# Patient Record
Sex: MALE | Race: BLACK OR AFRICAN AMERICAN | NOT HISPANIC OR LATINO | Employment: PART TIME | URBAN - METROPOLITAN AREA
[De-identification: names, ages, dates, MRNs, and addresses within clinical notes are randomized per-mention and may not be internally consistent; named-entity substitution may affect disease eponyms.]

---

## 2017-10-23 RX ORDER — LISINOPRIL AND HYDROCHLOROTHIAZIDE 20; 25 MG/1; MG/1
TABLET ORAL
Qty: 90 TABLET | Refills: 0 | Status: SHIPPED | OUTPATIENT
Start: 2017-10-23 | End: 2018-01-26 | Stop reason: SDUPTHER

## 2017-10-23 RX ORDER — PRAVASTATIN SODIUM 20 MG/1
TABLET ORAL
Qty: 90 TABLET | Refills: 0 | Status: SHIPPED | OUTPATIENT
Start: 2017-10-23 | End: 2018-01-26 | Stop reason: SDUPTHER

## 2017-10-23 NOTE — TELEPHONE ENCOUNTER
Patient needs lab every 6 months multiple statins CBC CMP lipid if his diabetes.  Hemoglobin A1c if hypothyroidism add T4 TSH see me 2 weeks after lab done to review lab, get refills, get  Into [facility]

## 2018-01-23 RX ORDER — LISINOPRIL AND HYDROCHLOROTHIAZIDE 20; 25 MG/1; MG/1
TABLET ORAL
Qty: 90 TABLET | Refills: 0 | Status: CANCELLED | OUTPATIENT
Start: 2018-01-23

## 2018-01-23 RX ORDER — PRAVASTATIN SODIUM 20 MG/1
TABLET ORAL
Qty: 90 TABLET | Refills: 0 | Status: CANCELLED | OUTPATIENT
Start: 2018-01-23

## 2018-01-26 ENCOUNTER — OFFICE VISIT (OUTPATIENT)
Dept: PRIMARY CARE CLINIC | Facility: CLINIC | Age: 58
End: 2018-01-26
Payer: COMMERCIAL

## 2018-01-26 VITALS
WEIGHT: 282 LBS | TEMPERATURE: 98 F | DIASTOLIC BLOOD PRESSURE: 78 MMHG | SYSTOLIC BLOOD PRESSURE: 121 MMHG | BODY MASS INDEX: 38.19 KG/M2 | OXYGEN SATURATION: 97 % | HEART RATE: 60 BPM | RESPIRATION RATE: 18 BRPM | HEIGHT: 72 IN

## 2018-01-26 DIAGNOSIS — H61.20 IMPACTED CERUMEN, UNSPECIFIED LATERALITY: ICD-10-CM

## 2018-01-26 DIAGNOSIS — E66.3 OVERWEIGHT: ICD-10-CM

## 2018-01-26 DIAGNOSIS — E78.5 HYPERLIPIDEMIA, UNSPECIFIED HYPERLIPIDEMIA TYPE: ICD-10-CM

## 2018-01-26 DIAGNOSIS — I10 HYPERTENSION, UNSPECIFIED TYPE: ICD-10-CM

## 2018-01-26 DIAGNOSIS — H18.419 ARCUS SENILIS, UNSPECIFIED LATERALITY: ICD-10-CM

## 2018-01-26 DIAGNOSIS — J40 BRONCHITIS: ICD-10-CM

## 2018-01-26 DIAGNOSIS — R19.7 DIARRHEA, UNSPECIFIED TYPE: ICD-10-CM

## 2018-01-26 DIAGNOSIS — L25.9 CONTACT DERMATITIS, UNSPECIFIED CONTACT DERMATITIS TYPE, UNSPECIFIED TRIGGER: ICD-10-CM

## 2018-01-26 DIAGNOSIS — J32.9 SINUSITIS, UNSPECIFIED CHRONICITY, UNSPECIFIED LOCATION: Primary | ICD-10-CM

## 2018-01-26 PROCEDURE — 99999 PR PBB SHADOW E&M-EST. PATIENT-LVL IV: CPT | Mod: PBBFAC,,, | Performed by: FAMILY MEDICINE

## 2018-01-26 PROCEDURE — 99213 OFFICE O/P EST LOW 20 MIN: CPT | Mod: 25,S$GLB,, | Performed by: FAMILY MEDICINE

## 2018-01-26 PROCEDURE — 96372 THER/PROPH/DIAG INJ SC/IM: CPT | Mod: S$GLB,,, | Performed by: FAMILY MEDICINE

## 2018-01-26 RX ORDER — ALBUTEROL SULFATE 90 UG/1
2 AEROSOL, METERED RESPIRATORY (INHALATION) EVERY 4 HOURS PRN
Qty: 1 INHALER | Refills: 5 | Status: SHIPPED | OUTPATIENT
Start: 2018-01-26 | End: 2019-01-30

## 2018-01-26 RX ORDER — AZITHROMYCIN 250 MG/1
TABLET, FILM COATED ORAL
Qty: 6 TABLET | Refills: 0 | Status: SHIPPED | OUTPATIENT
Start: 2018-01-26 | End: 2018-01-30

## 2018-01-26 RX ORDER — METHYLPREDNISOLONE 4 MG/1
TABLET ORAL
Qty: 1 PACKAGE | Refills: 0 | Status: SHIPPED | OUTPATIENT
Start: 2018-01-26 | End: 2018-02-16

## 2018-01-26 RX ORDER — PROMETHAZINE HYDROCHLORIDE AND CODEINE PHOSPHATE 6.25; 1 MG/5ML; MG/5ML
5 SOLUTION ORAL EVERY 6 HOURS PRN
Qty: 180 ML | Refills: 0 | Status: SHIPPED | OUTPATIENT
Start: 2018-01-26 | End: 2019-01-30

## 2018-01-26 RX ORDER — BETAMETHASONE SODIUM PHOSPHATE AND BETAMETHASONE ACETATE 3; 3 MG/ML; MG/ML
12 INJECTION, SUSPENSION INTRA-ARTICULAR; INTRALESIONAL; INTRAMUSCULAR; SOFT TISSUE
Status: COMPLETED | OUTPATIENT
Start: 2018-01-26 | End: 2018-01-26

## 2018-01-26 RX ORDER — LISINOPRIL AND HYDROCHLOROTHIAZIDE 20; 25 MG/1; MG/1
1 TABLET ORAL DAILY
Qty: 90 TABLET | Refills: 0 | Status: SHIPPED | OUTPATIENT
Start: 2018-01-26 | End: 2018-04-25 | Stop reason: SDUPTHER

## 2018-01-26 RX ORDER — CLOTRIMAZOLE AND BETAMETHASONE DIPROPIONATE 10; .64 MG/G; MG/G
CREAM TOPICAL 2 TIMES DAILY
Qty: 45 G | Refills: 0 | Status: SHIPPED | OUTPATIENT
Start: 2018-01-26 | End: 2019-01-30 | Stop reason: SDUPTHER

## 2018-01-26 RX ORDER — DIPHENOXYLATE HYDROCHLORIDE AND ATROPINE SULFATE 2.5; .025 MG/1; MG/1
1 TABLET ORAL 4 TIMES DAILY PRN
Qty: 20 TABLET | Refills: 0 | Status: SHIPPED | OUTPATIENT
Start: 2018-01-26 | End: 2018-02-05

## 2018-01-26 RX ORDER — PRAVASTATIN SODIUM 20 MG/1
20 TABLET ORAL DAILY
Qty: 90 TABLET | Refills: 0 | Status: SHIPPED | OUTPATIENT
Start: 2018-01-26 | End: 2018-04-25 | Stop reason: SDUPTHER

## 2018-01-26 RX ADMIN — BETAMETHASONE SODIUM PHOSPHATE AND BETAMETHASONE ACETATE 12 MG: 3; 3 INJECTION, SUSPENSION INTRA-ARTICULAR; INTRALESIONAL; INTRAMUSCULAR; SOFT TISSUE at 01:01

## 2018-01-26 NOTE — PROGRESS NOTES
Patient id by name and . 12 Mg Celestone given im to right ventrogluteal using aseptic technique. Patient tolerated welll

## 2018-01-26 NOTE — PROGRESS NOTES
Subjective:       Patient ID: Willy Delgado is a 57 y.o. male.    Chief Complaint: Cough and Medication Refill    HPI: 57-year-old male-- medication refills, pneumonia shot, flu shot, and cold.  Cold started 2 days ago--no fever, + runny nose, + sore throat, + cough, + phlegm--green.  Occasional wheezing no pneumonia asthma or TB-no smoking.  Diarrhea started yesterday--none last night no diarrhea today so far but does have some abdominal cramping.  No nausea vomiting ulcers hepatitis gallbladder passing blood vomiting blood black bowel movement  ROS:  Skin: no psoriasis, eczema, skin cancer--rash around his waist where he wears his underwear  HEENT: No headache, ocular pain, blurred vision, diplopia, epistaxis, hoarseness change in voice, thyroid trouble  Lung: No pneumonia, asthma, Tb, SOB, no smoking + wheezing  Heart: No chest pain, ankle edema, palpitations, MI, aureliano murmur, +hypertension,+ hyperlipidemia  Abdomen: No nausea, vomiting, +diarrhea, no  constipation, ulcers, hepatitis, gallbladder disease, melena, hematochezia, hematemesis  : no UTI, renal disease, stones  MS: no fractures,+ O/A--arthritis knee and back, lupus, rheumatoid, gout  Neuro: No dizziness, LOC, seizures   No diabetes, no anemia, no anxiety, no depression   , 5 children, patient's  L&R--lives at home with daughter who usually is and This       Objective:   Physical Exam:  General: Well nourished, well developed, no acute distress + overweight  Skin: Hyperpigmented skin in the elastic band area of his underwear probable contact dermatitis with eczema and possible yeast as is in no fat   HEENT: Eyes PERRLA, EOM intact, gray ring around the eye-arcus senilis nose + clear discharge throat +erythematous 1/4   NECK: Supple, no bruits, No JVD, no nodes  Lungs: Clear, no rales, rhonchi, wheezing + coarse cough  Heart: Regular rate and rhythm, no murmurs, gallops, or rubs  Abdomen: flat, bowel sounds positive, no  tenderness, or organomegaly  MS: Range of motion and muscle strength intact  Neuro: Alert, CN intact, oriented X 3  Extremities: No cyanosis, clubbing, or edema         Assessment:       1. Sinusitis, unspecified chronicity, unspecified location    2. Bronchitis    3. Diarrhea, unspecified type    4. Arcus senilis, unspecified laterality    5. Hypertension, unspecified type    6. Hyperlipidemia, unspecified hyperlipidemia type    7. Overweight    8. Contact dermatitis, unspecified contact dermatitis type, unspecified trigger    9. Impacted cerumen, unspecified laterality        Plan:       Sinusitis, unspecified chronicity, unspecified location    Bronchitis    Diarrhea, unspecified type    Arcus senilis, unspecified laterality    Hypertension, unspecified type  -     CBC auto differential; Future; Expected date: 01/26/2018  -     Comprehensive metabolic panel; Future; Expected date: 01/26/2018  -     Lipid panel; Future; Expected date: 01/26/2018  -     EKG 12-lead; Future  -     X-Ray Chest PA And Lateral; Future; Expected date: 01/26/2018  -     Urinalysis  -     T4, free; Future; Expected date: 01/26/2018  -     TSH; Future; Expected date: 01/26/2018    Hyperlipidemia, unspecified hyperlipidemia type    Overweight    Contact dermatitis, unspecified contact dermatitis type, unspecified trigger    Impacted cerumen, unspecified laterality    Other orders  -     lisinopril-hydrochlorothiazide (PRINZIDE,ZESTORETIC) 20-25 mg Tab; Take 1 tablet by mouth once daily.  Dispense: 90 tablet; Refill: 0  -     pravastatin (PRAVACHOL) 20 MG tablet; Take 1 tablet (20 mg total) by mouth once daily.  Dispense: 90 tablet; Refill: 0  -     azithromycin (Z-DARIUS) 250 MG tablet; 2 tabs by mouth day 1, then 1 tab by mouth daily x 4 days  Dispense: 6 tablet; Refill: 0  -     promethazine-codeine 6.25-10 mg/5 ml (PHENERGAN WITH CODEINE) 6.25-10 mg/5 mL syrup; Take 5 mLs by mouth every 6 (six) hours as needed for Cough.  Dispense: 180 mL;  Refill: 0  -     betamethasone acetate-betamethasone sodium phosphate injection 12 mg; Inject 2 mLs (12 mg total) into the muscle one time.  -     methylPREDNISolone (MEDROL DOSEPACK) 4 mg tablet; use as directed  Dispense: 1 Package; Refill: 0  -     diphenoxylate-atropine 2.5-0.025 mg (LOMOTIL) 2.5-0.025 mg per tablet; Take 1 tablet by mouth 4 (four) times daily as needed for Diarrhea.  Dispense: 20 tablet; Refill: 0  -     albuterol 90 mcg/actuation inhaler; Inhale 2 puffs into the lungs every 4 (four) hours as needed for Wheezing or Shortness of Breath. Rescue  Dispense: 1 Inhaler; Refill: 5  -     clotrimazole-betamethasone 1-0.05% (LOTRISONE) cream; Apply topically 2 (two) times daily.  Dispense: 45 g; Refill: 0      Pravachol Celestone Zithromax Medrol Phenergan With Codeine  For rash Lotrisone cream twice a day  Cerumen impaction when comes to do landed ears irrigated  Routine lab once fasting   Okay flu shot and pneumonia shot in 7 days or 2 weeks can do with days he gets lab get flu shot and pneumonia shot ears irrigated and

## 2018-02-09 ENCOUNTER — CLINICAL SUPPORT (OUTPATIENT)
Dept: PRIMARY CARE CLINIC | Facility: CLINIC | Age: 58
End: 2018-02-09
Payer: COMMERCIAL

## 2018-02-09 DIAGNOSIS — I10 HYPERTENSION, UNSPECIFIED TYPE: ICD-10-CM

## 2018-02-09 LAB
ALBUMIN SERPL BCP-MCNC: 3.9 G/DL
ALP SERPL-CCNC: 92 U/L
ALT SERPL W/O P-5'-P-CCNC: 23 U/L
ANION GAP SERPL CALC-SCNC: 13 MMOL/L
AST SERPL-CCNC: 24 U/L
BASOPHILS # BLD AUTO: 0.03 K/UL
BASOPHILS NFR BLD: 0.5 %
BILIRUB SERPL-MCNC: 0.4 MG/DL
BUN SERPL-MCNC: 15 MG/DL
CALCIUM SERPL-MCNC: 9.8 MG/DL
CHLORIDE SERPL-SCNC: 99 MMOL/L
CHOLEST SERPL-MCNC: 155 MG/DL
CHOLEST/HDLC SERPL: 3.4 {RATIO}
CO2 SERPL-SCNC: 27 MMOL/L
CREAT SERPL-MCNC: 1.2 MG/DL
DIFFERENTIAL METHOD: NORMAL
EOSINOPHIL # BLD AUTO: 0.1 K/UL
EOSINOPHIL NFR BLD: 1.8 %
ERYTHROCYTE [DISTWIDTH] IN BLOOD BY AUTOMATED COUNT: 14.5 %
EST. GFR  (AFRICAN AMERICAN): >60 ML/MIN/1.73 M^2
EST. GFR  (NON AFRICAN AMERICAN): >60 ML/MIN/1.73 M^2
GLUCOSE SERPL-MCNC: 95 MG/DL
HCT VFR BLD AUTO: 45 %
HDLC SERPL-MCNC: 46 MG/DL
HDLC SERPL: 29.7 %
HGB BLD-MCNC: 14.9 G/DL
IMM GRANULOCYTES # BLD AUTO: 0.03 K/UL
IMM GRANULOCYTES NFR BLD AUTO: 0.5 %
LDLC SERPL CALC-MCNC: 85.6 MG/DL
LYMPHOCYTES # BLD AUTO: 1.5 K/UL
LYMPHOCYTES NFR BLD: 23.1 %
MCH RBC QN AUTO: 28 PG
MCHC RBC AUTO-ENTMCNC: 33.1 G/DL
MCV RBC AUTO: 84 FL
MONOCYTES # BLD AUTO: 0.7 K/UL
MONOCYTES NFR BLD: 10 %
NEUTROPHILS # BLD AUTO: 4.2 K/UL
NEUTROPHILS NFR BLD: 64.1 %
NONHDLC SERPL-MCNC: 109 MG/DL
NRBC BLD-RTO: 0 /100 WBC
PLATELET # BLD AUTO: 208 K/UL
PMV BLD AUTO: 11.6 FL
POTASSIUM SERPL-SCNC: 3.7 MMOL/L
PROT SERPL-MCNC: 8 G/DL
RBC # BLD AUTO: 5.33 M/UL
SODIUM SERPL-SCNC: 139 MMOL/L
T4 FREE SERPL-MCNC: 1.07 NG/DL
TRIGL SERPL-MCNC: 117 MG/DL
TSH SERPL DL<=0.005 MIU/L-ACNC: 2.09 UIU/ML
WBC # BLD AUTO: 6.57 K/UL

## 2018-02-09 PROCEDURE — 84439 ASSAY OF FREE THYROXINE: CPT

## 2018-02-09 PROCEDURE — 80053 COMPREHEN METABOLIC PANEL: CPT

## 2018-02-09 PROCEDURE — 80061 LIPID PANEL: CPT

## 2018-02-09 PROCEDURE — 84443 ASSAY THYROID STIM HORMONE: CPT

## 2018-02-09 PROCEDURE — 99999 PR PBB SHADOW E&M-EST. PATIENT-LVL II: CPT | Mod: PBBFAC,,,

## 2018-02-09 PROCEDURE — 85025 COMPLETE CBC W/AUTO DIFF WBC: CPT

## 2018-04-27 RX ORDER — PRAVASTATIN SODIUM 20 MG/1
TABLET ORAL
Qty: 90 TABLET | Refills: 1 | Status: SHIPPED | OUTPATIENT
Start: 2018-04-27 | End: 2018-07-31 | Stop reason: SDUPTHER

## 2018-04-27 RX ORDER — LISINOPRIL AND HYDROCHLOROTHIAZIDE 20; 25 MG/1; MG/1
TABLET ORAL
Qty: 90 TABLET | Refills: 1 | Status: SHIPPED | OUTPATIENT
Start: 2018-04-27 | End: 2018-07-31 | Stop reason: SDUPTHER

## 2018-07-31 NOTE — TELEPHONE ENCOUNTER
----- Message from Janet Matthews sent at 7/31/2018  8:36 AM CDT -----  Pt needs a refill on his blood pressure meds and chol. Med called into [facility] in [place]. He is out and needs asap

## 2018-08-01 RX ORDER — PRAVASTATIN SODIUM 20 MG/1
20 TABLET ORAL DAILY
Qty: 90 TABLET | Refills: 0 | Status: SHIPPED | OUTPATIENT
Start: 2018-08-01 | End: 2019-01-28 | Stop reason: SDUPTHER

## 2018-08-01 RX ORDER — LISINOPRIL AND HYDROCHLOROTHIAZIDE 20; 25 MG/1; MG/1
1 TABLET ORAL DAILY
Qty: 90 TABLET | Refills: 0 | Status: SHIPPED | OUTPATIENT
Start: 2018-08-01 | End: 2019-01-28 | Stop reason: SDUPTHER

## 2019-01-30 ENCOUNTER — OFFICE VISIT (OUTPATIENT)
Dept: PRIMARY CARE CLINIC | Facility: CLINIC | Age: 59
End: 2019-01-30
Payer: MEDICARE

## 2019-01-30 VITALS
DIASTOLIC BLOOD PRESSURE: 79 MMHG | HEART RATE: 64 BPM | OXYGEN SATURATION: 97 % | SYSTOLIC BLOOD PRESSURE: 118 MMHG | BODY MASS INDEX: 36.86 KG/M2 | HEIGHT: 72 IN | TEMPERATURE: 97 F | WEIGHT: 272.13 LBS | RESPIRATION RATE: 18 BRPM

## 2019-01-30 DIAGNOSIS — E66.3 OVERWEIGHT: ICD-10-CM

## 2019-01-30 DIAGNOSIS — L25.9 CONTACT DERMATITIS, UNSPECIFIED CONTACT DERMATITIS TYPE, UNSPECIFIED TRIGGER: ICD-10-CM

## 2019-01-30 DIAGNOSIS — I10 HYPERTENSION, UNSPECIFIED TYPE: Primary | ICD-10-CM

## 2019-01-30 DIAGNOSIS — E78.5 HYPERLIPIDEMIA, UNSPECIFIED HYPERLIPIDEMIA TYPE: ICD-10-CM

## 2019-01-30 PROCEDURE — 3078F DIAST BP <80 MM HG: CPT | Mod: CPTII,S$GLB,, | Performed by: FAMILY MEDICINE

## 2019-01-30 PROCEDURE — 99999 PR PBB SHADOW E&M-EST. PATIENT-LVL IV: ICD-10-PCS | Mod: PBBFAC,,, | Performed by: FAMILY MEDICINE

## 2019-01-30 PROCEDURE — 3074F SYST BP LT 130 MM HG: CPT | Mod: CPTII,S$GLB,, | Performed by: FAMILY MEDICINE

## 2019-01-30 PROCEDURE — 99999 PR PBB SHADOW E&M-EST. PATIENT-LVL IV: CPT | Mod: PBBFAC,,, | Performed by: FAMILY MEDICINE

## 2019-01-30 PROCEDURE — 3078F PR MOST RECENT DIASTOLIC BLOOD PRESSURE < 80 MM HG: ICD-10-PCS | Mod: CPTII,S$GLB,, | Performed by: FAMILY MEDICINE

## 2019-01-30 PROCEDURE — 99214 PR OFFICE/OUTPT VISIT, EST, LEVL IV, 30-39 MIN: ICD-10-PCS | Mod: S$GLB,,, | Performed by: FAMILY MEDICINE

## 2019-01-30 PROCEDURE — 99214 OFFICE O/P EST MOD 30 MIN: CPT | Mod: S$GLB,,, | Performed by: FAMILY MEDICINE

## 2019-01-30 PROCEDURE — 3008F PR BODY MASS INDEX (BMI) DOCUMENTED: ICD-10-PCS | Mod: CPTII,S$GLB,, | Performed by: FAMILY MEDICINE

## 2019-01-30 PROCEDURE — 99499 RISK ADDL DX/OHS AUDIT: ICD-10-PCS | Mod: S$GLB,,, | Performed by: FAMILY MEDICINE

## 2019-01-30 PROCEDURE — 3008F BODY MASS INDEX DOCD: CPT | Mod: CPTII,S$GLB,, | Performed by: FAMILY MEDICINE

## 2019-01-30 PROCEDURE — 3074F PR MOST RECENT SYSTOLIC BLOOD PRESSURE < 130 MM HG: ICD-10-PCS | Mod: CPTII,S$GLB,, | Performed by: FAMILY MEDICINE

## 2019-01-30 PROCEDURE — 99499 UNLISTED E&M SERVICE: CPT | Mod: S$GLB,,, | Performed by: FAMILY MEDICINE

## 2019-01-30 RX ORDER — PRAVASTATIN SODIUM 20 MG/1
TABLET ORAL
Qty: 90 TABLET | Refills: 0 | Status: SHIPPED | OUTPATIENT
Start: 2019-01-30 | End: 2019-04-29 | Stop reason: SDUPTHER

## 2019-01-30 RX ORDER — LISINOPRIL AND HYDROCHLOROTHIAZIDE 20; 25 MG/1; MG/1
1 TABLET ORAL DAILY
Qty: 90 TABLET | Refills: 0 | Status: SHIPPED | OUTPATIENT
Start: 2019-01-30 | End: 2019-04-29 | Stop reason: SDUPTHER

## 2019-01-30 RX ORDER — CLOTRIMAZOLE AND BETAMETHASONE DIPROPIONATE 10; .64 MG/G; MG/G
CREAM TOPICAL 2 TIMES DAILY
Qty: 45 G | Refills: 0 | Status: SHIPPED | OUTPATIENT
Start: 2019-01-30 | End: 2019-05-03

## 2019-01-30 RX ORDER — HYDROCODONE BITARTRATE AND ACETAMINOPHEN 10; 325 MG/1; MG/1
1 TABLET ORAL EVERY 6 HOURS PRN
Refills: 0 | COMMUNITY
Start: 2019-01-24 | End: 2019-02-28

## 2019-01-30 NOTE — TELEPHONE ENCOUNTER
Pt not seen over a year 1-16-18-- Last lab Feb 2018 supposed to do lab q.6 months with hyperlipidemia OK 3 months refills give time to come in get seen get additional lab CBCs CMP lipid UA if over 2-3 years and chest x-ray EKG normal refills without being seen or getting lab

## 2019-01-30 NOTE — PROGRESS NOTES
Subjective:       Patient ID: Willy Delgado is a 58 y.o. male.    Chief Complaint: Annual Exam    HPI: 59 yo BM --annual checkup---had flu shot in January 2 has not had his pneumonia shot had colonoscopy 7 years ago.  History of hypertension hyperlipidemia but patient in for his annual checkup--got refills of blood pressure medicine and cholesterol medication  ROS:  Skin: no psoriasis, eczema, skin cancer--rash around his waist where he wears his underwear--lesions were going away with the cream that I had prescribed starting to come back now never saw Dermatology  HEENT: No headache, ocular pain, blurred vision, diplopia, epistaxis, hoarseness change in voice, thyroid trouble  Lung: No pneumonia, asthma, Tb, SOB, no smoking   Heart: No chest pain, ankle edema, palpitations, MI, aureliano murmur, +hypertension,+ hyperlipidemia--no stent bypass arrhythmia  Abdomen: No nausea, vomiting, diarrhea, no  constipation, ulcers, hepatitis, gallbladder disease, melena, hematochezia, hematemesis  : no UTI, renal disease, stones, prostate  MS: no fractures,+ O/A--arthritis knee and back--off and on uses ibuprofen, lupus, rheumatoid, gout  Neuro: No dizziness, LOC, seizures   No diabetes, no anemia, no anxiety, no depression   , 5 children, patient's  L&R--lives at home with daughter who usually     Objective:   Physical Exam:  General: Well nourished, well developed, no acute distress + overweight  Skin: Hyperpigmented skin in the elastic band area of his underwear probable contact dermatitis with eczema and possible yeast as is in no fat   HEENT: Eyes PERRLA, EOM intact, gray ring around the eye-arcus senilis nose no discharge throat nonerythematous hears little lax but clear  NECK: Supple, no bruits, No JVD, no nodes  Lungs: Clear, no rales, rhonchi, wheezing   Heart: Regular rate and rhythm, no murmurs, gallops, or rubs  Abdomen: flat, bowel sounds positive, no tenderness, or organomegaly  MS: Range of  motion and muscle strength intact  Neuro: Alert, CN intact, oriented X 3  Extremities: No cyanosis, clubbing, or edema         Assessment:       1. Hypertension, unspecified type    2. Hyperlipidemia, unspecified hyperlipidemia type    3. Overweight    4. Contact dermatitis, unspecified contact dermatitis type, unspecified trigger        Plan:       Hypertension, unspecified type    Hyperlipidemia, unspecified hyperlipidemia type    Overweight    Contact dermatitis, unspecified contact dermatitis type, unspecified trigger      six months refills all medication--blood pressure and cholesterol  For rash Lotrisone cream twice a day--if desires can see Dermatology  Routine lab once fasting --CBCs CMP lipids stool guaiac UA chest x-ray EKG--had EKG showed first-degree AV block will not repeat at this time February 2018 thyroid was also okay will not repeat that at this time   In Six months to CMP CBCs lipid--and 1 year CBCs CMP lipid stool guaiac chest x-ray EKG T4 TSH

## 2019-02-12 NOTE — PROGRESS NOTES
Patient, Willy Delgado (MRN #[id number]), presented with a recorded BMI of 36.9 kg/m^2 and a documented comorbidity(s):  - Hypertension  - Hyperlipidemia  to which the severe obesity is a contributing factor. This is consistent with the definition of severe obesity (BMI 35.0-39.9) with comorbidity (ICD-10 E66.01, Z68.35). The patient's severe obesity was monitored, evaluated, addressed and/or treated. This addendum to the medical record is made on 02/12/2019.

## 2019-02-28 ENCOUNTER — OFFICE VISIT (OUTPATIENT)
Dept: PRIMARY CARE CLINIC | Facility: CLINIC | Age: 59
End: 2019-02-28
Payer: MEDICARE

## 2019-02-28 VITALS
HEART RATE: 73 BPM | HEIGHT: 72 IN | TEMPERATURE: 97 F | DIASTOLIC BLOOD PRESSURE: 86 MMHG | SYSTOLIC BLOOD PRESSURE: 123 MMHG | OXYGEN SATURATION: 98 % | BODY MASS INDEX: 36.57 KG/M2 | RESPIRATION RATE: 18 BRPM | WEIGHT: 270 LBS

## 2019-02-28 DIAGNOSIS — H53.8 BLURRED VISION: ICD-10-CM

## 2019-02-28 DIAGNOSIS — H18.419 ARCUS SENILIS, UNSPECIFIED LATERALITY: ICD-10-CM

## 2019-02-28 DIAGNOSIS — H26.9 CATARACT OF BOTH EYES, UNSPECIFIED CATARACT TYPE: ICD-10-CM

## 2019-02-28 DIAGNOSIS — R51.9 NONINTRACTABLE HEADACHE, UNSPECIFIED CHRONICITY PATTERN, UNSPECIFIED HEADACHE TYPE: ICD-10-CM

## 2019-02-28 DIAGNOSIS — S16.1XXA STRAIN OF NECK MUSCLE, INITIAL ENCOUNTER: Primary | ICD-10-CM

## 2019-02-28 DIAGNOSIS — I10 HYPERTENSION, UNSPECIFIED TYPE: ICD-10-CM

## 2019-02-28 DIAGNOSIS — E66.3 OVERWEIGHT: ICD-10-CM

## 2019-02-28 DIAGNOSIS — M62.9 MUSCULOSKELETAL DISORDER INVOLVING UPPER TRAPEZIUS MUSCLE: ICD-10-CM

## 2019-02-28 DIAGNOSIS — E78.5 HYPERLIPIDEMIA, UNSPECIFIED HYPERLIPIDEMIA TYPE: ICD-10-CM

## 2019-02-28 PROCEDURE — 99214 PR OFFICE/OUTPT VISIT, EST, LEVL IV, 30-39 MIN: ICD-10-PCS | Mod: 25,S$GLB,, | Performed by: FAMILY MEDICINE

## 2019-02-28 PROCEDURE — 3008F BODY MASS INDEX DOCD: CPT | Mod: CPTII,S$GLB,, | Performed by: FAMILY MEDICINE

## 2019-02-28 PROCEDURE — 3079F PR MOST RECENT DIASTOLIC BLOOD PRESSURE 80-89 MM HG: ICD-10-PCS | Mod: CPTII,S$GLB,, | Performed by: FAMILY MEDICINE

## 2019-02-28 PROCEDURE — 3008F PR BODY MASS INDEX (BMI) DOCUMENTED: ICD-10-PCS | Mod: CPTII,S$GLB,, | Performed by: FAMILY MEDICINE

## 2019-02-28 PROCEDURE — 99214 OFFICE O/P EST MOD 30 MIN: CPT | Mod: 25,S$GLB,, | Performed by: FAMILY MEDICINE

## 2019-02-28 PROCEDURE — 3074F SYST BP LT 130 MM HG: CPT | Mod: CPTII,S$GLB,, | Performed by: FAMILY MEDICINE

## 2019-02-28 PROCEDURE — 99999 PR PBB SHADOW E&M-EST. PATIENT-LVL III: CPT | Mod: PBBFAC,,, | Performed by: FAMILY MEDICINE

## 2019-02-28 PROCEDURE — 3074F PR MOST RECENT SYSTOLIC BLOOD PRESSURE < 130 MM HG: ICD-10-PCS | Mod: CPTII,S$GLB,, | Performed by: FAMILY MEDICINE

## 2019-02-28 PROCEDURE — 96372 THER/PROPH/DIAG INJ SC/IM: CPT | Mod: S$GLB,,, | Performed by: FAMILY MEDICINE

## 2019-02-28 PROCEDURE — 3079F DIAST BP 80-89 MM HG: CPT | Mod: CPTII,S$GLB,, | Performed by: FAMILY MEDICINE

## 2019-02-28 PROCEDURE — 99999 PR PBB SHADOW E&M-EST. PATIENT-LVL III: ICD-10-PCS | Mod: PBBFAC,,, | Performed by: FAMILY MEDICINE

## 2019-02-28 PROCEDURE — 96372 PR INJECTION,THERAP/PROPH/DIAG2ST, IM OR SUBCUT: ICD-10-PCS | Mod: S$GLB,,, | Performed by: FAMILY MEDICINE

## 2019-02-28 RX ORDER — METHYLPREDNISOLONE 4 MG/1
TABLET ORAL
Qty: 1 PACKAGE | Refills: 0 | Status: SHIPPED | OUTPATIENT
Start: 2019-02-28 | End: 2019-05-03

## 2019-02-28 RX ORDER — TRIAMCINOLONE ACETONIDE 40 MG/ML
40 INJECTION, SUSPENSION INTRA-ARTICULAR; INTRAMUSCULAR
Status: COMPLETED | OUTPATIENT
Start: 2019-02-28 | End: 2019-02-28

## 2019-02-28 RX ORDER — HYDROCODONE BITARTRATE AND ACETAMINOPHEN 5; 325 MG/1; MG/1
1 TABLET ORAL EVERY 6 HOURS PRN
Qty: 30 TABLET | Refills: 0 | Status: SHIPPED | OUTPATIENT
Start: 2019-02-28 | End: 2019-05-03

## 2019-02-28 RX ORDER — IBUPROFEN 600 MG/1
600 TABLET ORAL EVERY 6 HOURS PRN
Qty: 100 TABLET | Refills: 5 | Status: SHIPPED | OUTPATIENT
Start: 2019-02-28 | End: 2019-10-02

## 2019-02-28 RX ORDER — CYCLOBENZAPRINE HCL 10 MG
10 TABLET ORAL 3 TIMES DAILY PRN
Qty: 30 TABLET | Refills: 5 | Status: SHIPPED | OUTPATIENT
Start: 2019-02-28 | End: 2019-03-10

## 2019-02-28 RX ADMIN — TRIAMCINOLONE ACETONIDE 40 MG: 40 INJECTION, SUSPENSION INTRA-ARTICULAR; INTRAMUSCULAR at 02:02

## 2019-02-28 NOTE — PROGRESS NOTES
Subjective:       Patient ID: Willy Delgado is a 58 y.o. male.    Chief Complaint: Neck Pain (neck pain that radiates to both shoulders and arms.) and Headache    HPI: 59 yo BM --patient complaining of neck pain--patient was at work last night--was sitting at the computer--putting things in the computer--started feeling pain in both shoulders in the back of the neck.  Patient had MRI 10-11 years ago and was told he had a pinched nerve at C4 level.  No trauma, no excessive activity--patient with pain with extension of the neck and with lateral rotation and flexion to the right with pain left side of the neck .  Some pain in the left upper trapezius muscle with raising the left arm does have pain in both upper trapezius muscle.  No lupus rheumatoid gout.       Pain mainly in the occipital area pain seemed to radiate to the frontal area--did have episode of blurred vision--both eyes.  Lasted about 1-2 hours.       Patient wears glasses.  No fever, runny nose, slight sore throat, no cough  ROS:  Skin: no psoriasis, eczema, skin cancer--had a rash in area of the underwear ran out of the cream  HEENT: +  Headache--mainly in the occipital area that radiated to the frontal area,no  ocular pain,+  blurred vision, no diplopia, epistaxis, hoarseness change in voice, thyroid trouble  Lung: No pneumonia, asthma, Tb, SOB, no smoking   Heart: No chest pain, ankle edema, palpitations, MI, aureliano murmur, +hypertension,+ hyperlipidemia--no stent bypass arrhythmia  Abdomen: No nausea, vomiting, diarrhea, no  constipation, ulcers, hepatitis, gallbladder disease, melena, hematochezia, hematemesis  : no UTI, renal disease, stones, prostate  MS: no fractures,+ O/A--arthritis knee and back--see history of present illness--pain mainly in the base of the skull neck and upper shoulders bilaterally--no lupus rheumatoid gout  Neuro: No dizziness, LOC, seizures   No diabetes, no anemia, no anxiety, no depression   , 5 children, works  [employer] --lives alone w    Objective:   Physical Exam:  General: Well nourished, well developed, no acute distress + overweight  Skin: Hyperpigmented skin in the elastic band area of his underwear probable contact dermatitis with eczema and possible yeast as is in no fat   HEENT: Eyes PERRLA, EOM intact, gray ring around the eye-arcus senilis nose no discharge throat nonerythematous hears little lax but clear  NECK: Supple, no bruits, No JVD, no nodes  Lungs: Clear, no rales, rhonchi, wheezing   Heart: Regular rate and rhythm, no murmurs, gallops, or rubs  Abdomen: flat, bowel sounds positive, no tenderness, or organomegaly  MS:  Tenderness cervical spine C3 through 7 bilaterally upper trapezius muscle sore at the base of the skull to the acromioclavicular areas bilaterally pain especially with extension in with lateral rotation and flexion to the right with spasm of the left upper trapezius muscle, pain with abduction of the arms to 90° but pain is much greater in the left upper trapezius than right unable raise arm overhead pain with anterior posterior flexion the shoulders hand grass, opposition thumb index and thumb 5th digit opposition of the forearm with flexion and extension all intact reflexes +2/4 Neuro: Alert, CN intact, oriented X 3 Romberg negative heel-toe intact  Extremities: No cyanosis, clubbing, or edema         Assessment:       1. Strain of neck muscle, initial encounter    2. Musculoskeletal disorder involving upper trapezius muscle    3. Overweight    4. Hyperlipidemia, unspecified hyperlipidemia type    5. Hypertension, unspecified type    6. Arcus senilis, unspecified laterality    7. Cataract of both eyes, unspecified cataract type    8. Blurred vision    9. Nonintractable headache, unspecified chronicity pattern, unspecified headache type        Plan:       Strain of neck muscle, initial encounter    Musculoskeletal disorder involving upper trapezius  muscle    Overweight    Hyperlipidemia, unspecified hyperlipidemia type    Hypertension, unspecified type    Arcus senilis, unspecified laterality    Cataract of both eyes, unspecified cataract type    Blurred vision    Nonintractable headache, unspecified chronicity pattern, unspecified headache type      neck and shoulder pain--cervical strain with upper trapezius muscle strain bilaterally--Kenalog/Norco/Medrol--ibuprofen/Flexeril  Moist Heat , Theragesic , ROM exercises  If headache gets worse or if blurred vision recurred go to the emergency room for possible CT scan of the brain with and without contrast--vision is normal now no significant headache at this time  For rash Lotrisone cream twice a day--if desires can see Dermatology  Patient usually does MRI of the cervical spine after 6 weeks but if severe pain in 2 weeks will attempt to get want that time does have a history of an MRI tender 11 years ago showing a pinched nerve at C4

## 2019-02-28 NOTE — PROGRESS NOTES
Pt identified by name and date of birth, denies any allergies, injection administered as ordered by aseptic technique tolerated well by pt

## 2019-03-06 ENCOUNTER — TELEPHONE (OUTPATIENT)
Dept: PRIMARY CARE CLINIC | Facility: CLINIC | Age: 59
End: 2019-03-06

## 2019-03-06 NOTE — TELEPHONE ENCOUNTER
See labs. Attempted to contact patient. Unable to leave message on machine. Will cont. To contact patient.     ----- Message from Mary Kay Irvin sent at 3/6/2019 12:07 PM CST -----  Contact: Patient   Type:  Patient Returning Call    Who Called:  Patient  Who Left Message for Patient:  NA  Does the patient know what this is regarding?:  NA  Best Call Back Number:

## 2019-03-20 DIAGNOSIS — Z12.11 COLON CANCER SCREENING: ICD-10-CM

## 2019-05-01 RX ORDER — PRAVASTATIN SODIUM 20 MG/1
TABLET ORAL
Qty: 90 TABLET | Refills: 1 | Status: SHIPPED | OUTPATIENT
Start: 2019-05-01 | End: 2019-10-30 | Stop reason: SDUPTHER

## 2019-05-01 RX ORDER — LISINOPRIL AND HYDROCHLOROTHIAZIDE 20; 25 MG/1; MG/1
1 TABLET ORAL DAILY
Qty: 90 TABLET | Refills: 1 | Status: SHIPPED | OUTPATIENT
Start: 2019-05-01 | End: 2019-10-30 | Stop reason: SDUPTHER

## 2019-05-03 ENCOUNTER — OFFICE VISIT (OUTPATIENT)
Dept: PRIMARY CARE CLINIC | Facility: CLINIC | Age: 59
End: 2019-05-03
Payer: MEDICARE

## 2019-05-03 VITALS
HEIGHT: 72 IN | RESPIRATION RATE: 18 BRPM | DIASTOLIC BLOOD PRESSURE: 74 MMHG | WEIGHT: 277 LBS | OXYGEN SATURATION: 98 % | SYSTOLIC BLOOD PRESSURE: 105 MMHG | HEART RATE: 88 BPM | BODY MASS INDEX: 37.52 KG/M2

## 2019-05-03 DIAGNOSIS — G89.29 CHRONIC BILATERAL LOW BACK PAIN WITH SCIATICA, SCIATICA LATERALITY UNSPECIFIED: ICD-10-CM

## 2019-05-03 DIAGNOSIS — M50.30 DDD (DEGENERATIVE DISC DISEASE), CERVICAL: ICD-10-CM

## 2019-05-03 DIAGNOSIS — M47.22 OSTEOARTHRITIS OF SPINE WITH RADICULOPATHY, CERVICAL REGION: ICD-10-CM

## 2019-05-03 DIAGNOSIS — I10 HYPERTENSION, UNSPECIFIED TYPE: ICD-10-CM

## 2019-05-03 DIAGNOSIS — Z11.59 NEED FOR HEPATITIS C SCREENING TEST: ICD-10-CM

## 2019-05-03 DIAGNOSIS — M25.512 CHRONIC LEFT SHOULDER PAIN: ICD-10-CM

## 2019-05-03 DIAGNOSIS — M54.40 CHRONIC BILATERAL LOW BACK PAIN WITH SCIATICA, SCIATICA LATERALITY UNSPECIFIED: ICD-10-CM

## 2019-05-03 DIAGNOSIS — H26.9 CATARACT OF BOTH EYES, UNSPECIFIED CATARACT TYPE: ICD-10-CM

## 2019-05-03 DIAGNOSIS — M62.9 MUSCULOSKELETAL DISORDER INVOLVING UPPER TRAPEZIUS MUSCLE: ICD-10-CM

## 2019-05-03 DIAGNOSIS — E66.3 OVERWEIGHT: ICD-10-CM

## 2019-05-03 DIAGNOSIS — G89.29 CHRONIC LEFT SHOULDER PAIN: ICD-10-CM

## 2019-05-03 DIAGNOSIS — E78.5 HYPERLIPIDEMIA, UNSPECIFIED HYPERLIPIDEMIA TYPE: ICD-10-CM

## 2019-05-03 DIAGNOSIS — S16.1XXD STRAIN OF NECK MUSCLE, SUBSEQUENT ENCOUNTER: Primary | ICD-10-CM

## 2019-05-03 PROBLEM — S16.1XXA CERVICAL STRAIN: Status: ACTIVE | Noted: 2019-05-03

## 2019-05-03 PROCEDURE — 3078F DIAST BP <80 MM HG: CPT | Mod: CPTII,S$GLB,, | Performed by: FAMILY MEDICINE

## 2019-05-03 PROCEDURE — 99213 OFFICE O/P EST LOW 20 MIN: CPT | Mod: S$GLB,,, | Performed by: FAMILY MEDICINE

## 2019-05-03 PROCEDURE — 99999 PR PBB SHADOW E&M-EST. PATIENT-LVL IV: ICD-10-PCS | Mod: PBBFAC,,, | Performed by: FAMILY MEDICINE

## 2019-05-03 PROCEDURE — 99213 PR OFFICE/OUTPT VISIT, EST, LEVL III, 20-29 MIN: ICD-10-PCS | Mod: S$GLB,,, | Performed by: FAMILY MEDICINE

## 2019-05-03 PROCEDURE — 3074F PR MOST RECENT SYSTOLIC BLOOD PRESSURE < 130 MM HG: ICD-10-PCS | Mod: CPTII,S$GLB,, | Performed by: FAMILY MEDICINE

## 2019-05-03 PROCEDURE — 3008F PR BODY MASS INDEX (BMI) DOCUMENTED: ICD-10-PCS | Mod: CPTII,S$GLB,, | Performed by: FAMILY MEDICINE

## 2019-05-03 PROCEDURE — 99999 PR PBB SHADOW E&M-EST. PATIENT-LVL IV: CPT | Mod: PBBFAC,,, | Performed by: FAMILY MEDICINE

## 2019-05-03 PROCEDURE — 3078F PR MOST RECENT DIASTOLIC BLOOD PRESSURE < 80 MM HG: ICD-10-PCS | Mod: CPTII,S$GLB,, | Performed by: FAMILY MEDICINE

## 2019-05-03 PROCEDURE — 3008F BODY MASS INDEX DOCD: CPT | Mod: CPTII,S$GLB,, | Performed by: FAMILY MEDICINE

## 2019-05-03 PROCEDURE — 3074F SYST BP LT 130 MM HG: CPT | Mod: CPTII,S$GLB,, | Performed by: FAMILY MEDICINE

## 2019-05-03 RX ORDER — HYDROCODONE BITARTRATE AND ACETAMINOPHEN 5; 325 MG/1; MG/1
1 TABLET ORAL EVERY 6 HOURS PRN
Qty: 30 TABLET | Refills: 0 | Status: SHIPPED | OUTPATIENT
Start: 2019-05-03 | End: 2019-05-13

## 2019-05-03 RX ORDER — TRAZODONE HYDROCHLORIDE 50 MG/1
50 TABLET ORAL NIGHTLY
Qty: 30 TABLET | Refills: 11 | Status: SHIPPED | OUTPATIENT
Start: 2019-05-03 | End: 2019-07-08
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
## 2019-05-06 ENCOUNTER — TELEPHONE (OUTPATIENT)
Dept: PRIMARY CARE CLINIC | Facility: CLINIC | Age: 59
End: 2019-05-06

## 2019-05-06 NOTE — TELEPHONE ENCOUNTER
----- Message from Sheila Aquino sent at 5/6/2019  1:25 PM CDT -----  Contact: Patient  Type:  Patient Returning Call    Who Called:  Willy, patient  Who Left Message for Patient:  Lilli  Does the patient know what this is regarding?:  Xray results  Best Call Back Number:  [phone]  Additional Information:  Missed your call, please call him back. Thanks.

## 2019-05-06 NOTE — TELEPHONE ENCOUNTER
Spoke with patient notified him of X-ray results. States understanding. Patient is scheduled for MRI thursday

## 2019-05-10 ENCOUNTER — LAB VISIT (OUTPATIENT)
Dept: LAB | Facility: HOSPITAL | Age: 59
End: 2019-05-10
Attending: FAMILY MEDICINE
Payer: MEDICARE

## 2019-05-10 ENCOUNTER — TELEPHONE (OUTPATIENT)
Dept: PRIMARY CARE CLINIC | Facility: CLINIC | Age: 59
End: 2019-05-10

## 2019-05-10 DIAGNOSIS — Z12.11 COLON CANCER SCREENING: ICD-10-CM

## 2019-05-10 LAB — HEMOCCULT STL QL IA: NEGATIVE

## 2019-05-10 PROCEDURE — 82274 ASSAY TEST FOR BLOOD FECAL: CPT

## 2019-05-10 NOTE — TELEPHONE ENCOUNTER
----- Message from Laura Gutierrez sent at 5/10/2019 10:29 AM CDT -----  Contact: self [phone]  Patient returned your call, please call back.  Thank you!

## 2019-05-13 ENCOUNTER — OFFICE VISIT (OUTPATIENT)
Dept: PRIMARY CARE CLINIC | Facility: CLINIC | Age: 59
End: 2019-05-13
Payer: MEDICARE

## 2019-05-13 VITALS
HEIGHT: 72 IN | HEART RATE: 71 BPM | RESPIRATION RATE: 18 BRPM | SYSTOLIC BLOOD PRESSURE: 113 MMHG | OXYGEN SATURATION: 98 % | BODY MASS INDEX: 38.06 KG/M2 | DIASTOLIC BLOOD PRESSURE: 78 MMHG | WEIGHT: 281 LBS

## 2019-05-13 DIAGNOSIS — M17.11 OSTEOARTHRITIS OF RIGHT KNEE, UNSPECIFIED OSTEOARTHRITIS TYPE: ICD-10-CM

## 2019-05-13 DIAGNOSIS — G89.29 CHRONIC LEFT SHOULDER PAIN: ICD-10-CM

## 2019-05-13 DIAGNOSIS — S16.1XXD STRAIN OF NECK MUSCLE, SUBSEQUENT ENCOUNTER: Primary | ICD-10-CM

## 2019-05-13 DIAGNOSIS — E78.5 HYPERLIPIDEMIA, UNSPECIFIED HYPERLIPIDEMIA TYPE: ICD-10-CM

## 2019-05-13 DIAGNOSIS — I10 HYPERTENSION, UNSPECIFIED TYPE: ICD-10-CM

## 2019-05-13 DIAGNOSIS — M25.512 CHRONIC LEFT SHOULDER PAIN: ICD-10-CM

## 2019-05-13 DIAGNOSIS — M50.30 DDD (DEGENERATIVE DISC DISEASE), CERVICAL: ICD-10-CM

## 2019-05-13 PROBLEM — M19.031: Status: RESOLVED | Noted: 2019-05-13 | Resolved: 2019-05-13

## 2019-05-13 PROBLEM — M19.031: Status: ACTIVE | Noted: 2019-05-13

## 2019-05-13 PROCEDURE — 99213 OFFICE O/P EST LOW 20 MIN: CPT | Mod: S$GLB,,, | Performed by: FAMILY MEDICINE

## 2019-05-13 PROCEDURE — 99999 PR PBB SHADOW E&M-EST. PATIENT-LVL V: CPT | Mod: PBBFAC,,, | Performed by: FAMILY MEDICINE

## 2019-05-13 PROCEDURE — 3074F PR MOST RECENT SYSTOLIC BLOOD PRESSURE < 130 MM HG: ICD-10-PCS | Mod: CPTII,S$GLB,, | Performed by: FAMILY MEDICINE

## 2019-05-13 PROCEDURE — 99999 PR PBB SHADOW E&M-EST. PATIENT-LVL V: ICD-10-PCS | Mod: PBBFAC,,, | Performed by: FAMILY MEDICINE

## 2019-05-13 PROCEDURE — 99499 UNLISTED E&M SERVICE: CPT | Mod: S$GLB,,, | Performed by: FAMILY MEDICINE

## 2019-05-13 PROCEDURE — 3008F PR BODY MASS INDEX (BMI) DOCUMENTED: ICD-10-PCS | Mod: CPTII,S$GLB,, | Performed by: FAMILY MEDICINE

## 2019-05-13 PROCEDURE — 99213 PR OFFICE/OUTPT VISIT, EST, LEVL III, 20-29 MIN: ICD-10-PCS | Mod: S$GLB,,, | Performed by: FAMILY MEDICINE

## 2019-05-13 PROCEDURE — 3074F SYST BP LT 130 MM HG: CPT | Mod: CPTII,S$GLB,, | Performed by: FAMILY MEDICINE

## 2019-05-13 PROCEDURE — 99499 RISK ADDL DX/OHS AUDIT: ICD-10-PCS | Mod: S$GLB,,, | Performed by: FAMILY MEDICINE

## 2019-05-13 PROCEDURE — 3078F PR MOST RECENT DIASTOLIC BLOOD PRESSURE < 80 MM HG: ICD-10-PCS | Mod: CPTII,S$GLB,, | Performed by: FAMILY MEDICINE

## 2019-05-13 PROCEDURE — 3078F DIAST BP <80 MM HG: CPT | Mod: CPTII,S$GLB,, | Performed by: FAMILY MEDICINE

## 2019-05-13 PROCEDURE — 3008F BODY MASS INDEX DOCD: CPT | Mod: CPTII,S$GLB,, | Performed by: FAMILY MEDICINE

## 2019-05-13 RX ORDER — HYDROCODONE BITARTRATE AND ACETAMINOPHEN 5; 325 MG/1; MG/1
1 TABLET ORAL EVERY 6 HOURS PRN
Qty: 30 TABLET | Refills: 0 | Status: SHIPPED | OUTPATIENT
Start: 2019-05-13 | End: 2019-09-05 | Stop reason: ALTCHOICE
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
## 2019-05-20 ENCOUNTER — OFFICE VISIT (OUTPATIENT)
Dept: PRIMARY CARE CLINIC | Facility: CLINIC | Age: 59
End: 2019-05-20
Payer: MEDICARE

## 2019-05-20 VITALS
SYSTOLIC BLOOD PRESSURE: 106 MMHG | BODY MASS INDEX: 37.65 KG/M2 | HEIGHT: 72 IN | OXYGEN SATURATION: 100 % | WEIGHT: 278 LBS | DIASTOLIC BLOOD PRESSURE: 75 MMHG | HEART RATE: 74 BPM | RESPIRATION RATE: 18 BRPM

## 2019-05-20 DIAGNOSIS — M17.11 OSTEOARTHRITIS OF RIGHT KNEE, UNSPECIFIED OSTEOARTHRITIS TYPE: ICD-10-CM

## 2019-05-20 DIAGNOSIS — E66.3 OVERWEIGHT: ICD-10-CM

## 2019-05-20 DIAGNOSIS — S46.812D INFRASPINATUS TENDON TEAR, LEFT, SUBSEQUENT ENCOUNTER: ICD-10-CM

## 2019-05-20 DIAGNOSIS — M50.30 DDD (DEGENERATIVE DISC DISEASE), CERVICAL: ICD-10-CM

## 2019-05-20 DIAGNOSIS — G89.29 CHRONIC BILATERAL LOW BACK PAIN WITHOUT SCIATICA: ICD-10-CM

## 2019-05-20 DIAGNOSIS — M54.50 CHRONIC BILATERAL LOW BACK PAIN WITHOUT SCIATICA: ICD-10-CM

## 2019-05-20 DIAGNOSIS — M67.814 BICEPS TENDINOSIS OF LEFT SHOULDER: ICD-10-CM

## 2019-05-20 DIAGNOSIS — I10 HYPERTENSION, UNSPECIFIED TYPE: ICD-10-CM

## 2019-05-20 DIAGNOSIS — M19.019 OSTEOARTHRITIS OF AC (ACROMIOCLAVICULAR) JOINT: ICD-10-CM

## 2019-05-20 DIAGNOSIS — E78.5 HYPERLIPIDEMIA, UNSPECIFIED HYPERLIPIDEMIA TYPE: ICD-10-CM

## 2019-05-20 PROCEDURE — 3008F BODY MASS INDEX DOCD: CPT | Mod: CPTII,S$GLB,, | Performed by: FAMILY MEDICINE

## 2019-05-20 PROCEDURE — 99999 PR PBB SHADOW E&M-EST. PATIENT-LVL III: CPT | Mod: PBBFAC,,, | Performed by: FAMILY MEDICINE

## 2019-05-20 PROCEDURE — 99999 PR PBB SHADOW E&M-EST. PATIENT-LVL III: ICD-10-PCS | Mod: PBBFAC,,, | Performed by: FAMILY MEDICINE

## 2019-05-20 PROCEDURE — 99214 OFFICE O/P EST MOD 30 MIN: CPT | Mod: S$GLB,,, | Performed by: FAMILY MEDICINE

## 2019-05-20 PROCEDURE — 3074F SYST BP LT 130 MM HG: CPT | Mod: CPTII,S$GLB,, | Performed by: FAMILY MEDICINE

## 2019-05-20 PROCEDURE — 99214 PR OFFICE/OUTPT VISIT, EST, LEVL IV, 30-39 MIN: ICD-10-PCS | Mod: S$GLB,,, | Performed by: FAMILY MEDICINE

## 2019-05-20 PROCEDURE — 3074F PR MOST RECENT SYSTOLIC BLOOD PRESSURE < 130 MM HG: ICD-10-PCS | Mod: CPTII,S$GLB,, | Performed by: FAMILY MEDICINE

## 2019-05-20 PROCEDURE — 3078F PR MOST RECENT DIASTOLIC BLOOD PRESSURE < 80 MM HG: ICD-10-PCS | Mod: CPTII,S$GLB,, | Performed by: FAMILY MEDICINE

## 2019-05-20 PROCEDURE — 3078F DIAST BP <80 MM HG: CPT | Mod: CPTII,S$GLB,, | Performed by: FAMILY MEDICINE

## 2019-05-20 PROCEDURE — 3008F PR BODY MASS INDEX (BMI) DOCUMENTED: ICD-10-PCS | Mod: CPTII,S$GLB,, | Performed by: FAMILY MEDICINE

## 2019-05-20 RX ORDER — OXYCODONE AND ACETAMINOPHEN 5; 325 MG/1; MG/1
1 TABLET ORAL EVERY 4 HOURS PRN
Qty: 30 TABLET | Refills: 0 | Status: SHIPPED | OUTPATIENT
Start: 2019-05-20 | End: 2019-07-08
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
## 2019-06-11 ENCOUNTER — PATIENT MESSAGE (OUTPATIENT)
Dept: ORTHOPEDICS | Facility: CLINIC | Age: 59
End: 2019-06-11

## 2019-07-08 ENCOUNTER — OFFICE VISIT (OUTPATIENT)
Dept: PRIMARY CARE CLINIC | Facility: CLINIC | Age: 59
End: 2019-07-08
Payer: MEDICARE

## 2019-07-08 VITALS
TEMPERATURE: 98 F | HEIGHT: 72 IN | SYSTOLIC BLOOD PRESSURE: 102 MMHG | WEIGHT: 284 LBS | HEART RATE: 62 BPM | DIASTOLIC BLOOD PRESSURE: 73 MMHG | OXYGEN SATURATION: 98 % | RESPIRATION RATE: 19 BRPM | BODY MASS INDEX: 38.47 KG/M2

## 2019-07-08 DIAGNOSIS — I10 HYPERTENSION, UNSPECIFIED TYPE: ICD-10-CM

## 2019-07-08 DIAGNOSIS — M17.31 POST-TRAUMATIC OSTEOARTHRITIS OF RIGHT KNEE: ICD-10-CM

## 2019-07-08 DIAGNOSIS — Z01.818 PREOP EXAMINATION: Primary | ICD-10-CM

## 2019-07-08 DIAGNOSIS — E78.5 HYPERLIPIDEMIA, UNSPECIFIED HYPERLIPIDEMIA TYPE: ICD-10-CM

## 2019-07-08 PROCEDURE — 3074F SYST BP LT 130 MM HG: CPT | Mod: CPTII,S$GLB,, | Performed by: INTERNAL MEDICINE

## 2019-07-08 PROCEDURE — 99999 PR PBB SHADOW E&M-EST. PATIENT-LVL III: CPT | Mod: PBBFAC,,, | Performed by: INTERNAL MEDICINE

## 2019-07-08 PROCEDURE — 3008F BODY MASS INDEX DOCD: CPT | Mod: CPTII,S$GLB,, | Performed by: INTERNAL MEDICINE

## 2019-07-08 PROCEDURE — 99999 PR PBB SHADOW E&M-EST. PATIENT-LVL III: ICD-10-PCS | Mod: PBBFAC,,, | Performed by: INTERNAL MEDICINE

## 2019-07-08 PROCEDURE — 3078F PR MOST RECENT DIASTOLIC BLOOD PRESSURE < 80 MM HG: ICD-10-PCS | Mod: CPTII,S$GLB,, | Performed by: INTERNAL MEDICINE

## 2019-07-08 PROCEDURE — 3078F DIAST BP <80 MM HG: CPT | Mod: CPTII,S$GLB,, | Performed by: INTERNAL MEDICINE

## 2019-07-08 PROCEDURE — 3008F PR BODY MASS INDEX (BMI) DOCUMENTED: ICD-10-PCS | Mod: CPTII,S$GLB,, | Performed by: INTERNAL MEDICINE

## 2019-07-08 PROCEDURE — 3074F PR MOST RECENT SYSTOLIC BLOOD PRESSURE < 130 MM HG: ICD-10-PCS | Mod: CPTII,S$GLB,, | Performed by: INTERNAL MEDICINE

## 2019-07-08 PROCEDURE — 99203 OFFICE O/P NEW LOW 30 MIN: CPT | Mod: S$GLB,,, | Performed by: INTERNAL MEDICINE

## 2019-07-08 PROCEDURE — 99203 PR OFFICE/OUTPT VISIT, NEW, LEVL III, 30-44 MIN: ICD-10-PCS | Mod: S$GLB,,, | Performed by: INTERNAL MEDICINE

## 2019-07-09 NOTE — PROGRESS NOTES
Subjective:       Patient ID: iWlly Delgado is a 58 y.o. male.    Chief Complaint: Pre-op Exam    HPI  Pt is here for surgical clearance for rt knee replacement has been treated conservative for many yrs and injections had old injury to rt knee in the past reqired open surgery with plate screws now need whole knee replaced pt denies any compliations with anesthesia pulmonary cardiac status stable but pt was told CXR showed exposure to abestoses follow up with pulmonologist but sob cp no MATIAS  No fever chill no other c/o PMH HTN HLP  Review of Systems   Constitutional: Negative for activity change, fatigue and unexpected weight change.   HENT: Negative for congestion, dental problem, nosebleeds and rhinorrhea.    Eyes: Negative for visual disturbance.   Respiratory: Negative for shortness of breath and wheezing.    Cardiovascular: Negative for chest pain and palpitations.   Gastrointestinal: Negative for constipation, diarrhea and nausea.   Genitourinary: Negative for difficulty urinating, dysuria and hematuria.   Musculoskeletal: Negative for arthralgias and myalgias.        Chronic rt knee pain   Skin: Negative for rash.   Neurological: Negative for weakness and headaches.   Hematological:        H/o DVT in rt leg with PE post op knee surgery 12 yrs ago was on po anticoagulant x 6 mos   Psychiatric/Behavioral: Negative for behavioral problems and dysphoric mood. The patient is not nervous/anxious.        Objective:      Physical Exam   Constitutional: He is oriented to person, place, and time. He appears well-developed and well-nourished. No distress.   HENT:   Head: Normocephalic and atraumatic.   Right Ear: External ear normal.   Left Ear: External ear normal.   Nose: Nose normal.   Mouth/Throat: Oropharynx is clear and moist. No oropharyngeal exudate.   Eyes: Pupils are equal, round, and reactive to light. Conjunctivae and EOM are normal. Right eye exhibits no discharge. Left eye exhibits no discharge.   Neck:  Normal range of motion. Neck supple. No thyromegaly present.   Cardiovascular: Normal rate, regular rhythm, normal heart sounds and intact distal pulses. Exam reveals no gallop and no friction rub.   No murmur heard.  Pulmonary/Chest: Effort normal and breath sounds normal. No respiratory distress. He has no wheezes. He has no rales. He exhibits no tenderness.   Abdominal: Soft. Bowel sounds are normal. He exhibits no distension. There is no tenderness. There is no rebound and no guarding.   Musculoskeletal: Normal range of motion. He exhibits tenderness (tenderness around rt knee joint  has thick rt knee brace). He exhibits no edema or deformity.   Lymphadenopathy:     He has no cervical adenopathy.   Neurological: He is alert and oriented to person, place, and time.   Skin: Skin is warm and dry. Capillary refill takes less than 2 seconds. No rash noted. No erythema.   Psychiatric: He has a normal mood and affect. Judgment and thought content normal.   Nursing note and vitals reviewed.      Assessment:       1. Preop examination    2. Post-traumatic osteoarthritis of right knee    3. Hypertension, unspecified type    4. Hyperlipidemia, unspecified hyperlipidemia type        Plan:       Preop examination  Comments:  labs reviewed pt is medically cleared for rt knee replacement under generalized anesthesia bu recommend prophylactic anticougulation with xarelto post op    Post-traumatic osteoarthritis of right knee    Hypertension, unspecified type  Comments:  stable on medications    Hyperlipidemia, unspecified hyperlipidemia type  Comments:  continue diet medications

## 2019-09-05 ENCOUNTER — OFFICE VISIT (OUTPATIENT)
Dept: PRIMARY CARE CLINIC | Facility: CLINIC | Age: 59
End: 2019-09-05
Payer: MEDICARE

## 2019-09-05 VITALS
BODY MASS INDEX: 36.44 KG/M2 | HEIGHT: 72 IN | DIASTOLIC BLOOD PRESSURE: 74 MMHG | HEART RATE: 106 BPM | WEIGHT: 269 LBS | SYSTOLIC BLOOD PRESSURE: 101 MMHG | OXYGEN SATURATION: 96 % | RESPIRATION RATE: 19 BRPM

## 2019-09-05 DIAGNOSIS — L25.9 CONTACT DERMATITIS, UNSPECIFIED CONTACT DERMATITIS TYPE, UNSPECIFIED TRIGGER: ICD-10-CM

## 2019-09-05 DIAGNOSIS — S16.1XXD STRAIN OF NECK MUSCLE, SUBSEQUENT ENCOUNTER: Primary | ICD-10-CM

## 2019-09-05 DIAGNOSIS — I10 HYPERTENSION, UNSPECIFIED TYPE: ICD-10-CM

## 2019-09-05 DIAGNOSIS — E78.5 HYPERLIPIDEMIA, UNSPECIFIED HYPERLIPIDEMIA TYPE: ICD-10-CM

## 2019-09-05 DIAGNOSIS — E66.3 OVERWEIGHT: ICD-10-CM

## 2019-09-05 DIAGNOSIS — Z11.59 NEED FOR HEPATITIS C SCREENING TEST: ICD-10-CM

## 2019-09-05 DIAGNOSIS — S46.911A STRAIN OF RIGHT SHOULDER, INITIAL ENCOUNTER: ICD-10-CM

## 2019-09-05 PROBLEM — M50.30 DDD (DEGENERATIVE DISC DISEASE), CERVICAL: Status: RESOLVED | Noted: 2019-05-03 | Resolved: 2019-09-05

## 2019-09-05 PROBLEM — G89.29 CHRONIC LEFT SHOULDER PAIN: Status: RESOLVED | Noted: 2019-05-03 | Resolved: 2019-09-05

## 2019-09-05 PROBLEM — M25.512 CHRONIC LEFT SHOULDER PAIN: Status: RESOLVED | Noted: 2019-05-03 | Resolved: 2019-09-05

## 2019-09-05 PROCEDURE — 3074F PR MOST RECENT SYSTOLIC BLOOD PRESSURE < 130 MM HG: ICD-10-PCS | Mod: CPTII,S$GLB,, | Performed by: FAMILY MEDICINE

## 2019-09-05 PROCEDURE — 99999 PR PBB SHADOW E&M-EST. PATIENT-LVL IV: CPT | Mod: PBBFAC,,, | Performed by: FAMILY MEDICINE

## 2019-09-05 PROCEDURE — 90472 IMMUNIZATION ADMIN EACH ADD: CPT | Mod: S$GLB,,, | Performed by: FAMILY MEDICINE

## 2019-09-05 PROCEDURE — 99214 OFFICE O/P EST MOD 30 MIN: CPT | Mod: 25,S$GLB,, | Performed by: FAMILY MEDICINE

## 2019-09-05 PROCEDURE — 90686 IIV4 VACC NO PRSV 0.5 ML IM: CPT | Mod: S$GLB,,, | Performed by: FAMILY MEDICINE

## 2019-09-05 PROCEDURE — 96372 THER/PROPH/DIAG INJ SC/IM: CPT | Mod: 59,S$GLB,, | Performed by: FAMILY MEDICINE

## 2019-09-05 PROCEDURE — 99214 PR OFFICE/OUTPT VISIT, EST, LEVL IV, 30-39 MIN: ICD-10-PCS | Mod: 25,S$GLB,, | Performed by: FAMILY MEDICINE

## 2019-09-05 PROCEDURE — 3008F BODY MASS INDEX DOCD: CPT | Mod: CPTII,S$GLB,, | Performed by: FAMILY MEDICINE

## 2019-09-05 PROCEDURE — 90472 TD VACCINE GREATER THAN OR EQUAL TO 7YO PRESERVATIVE FREE IM: ICD-10-PCS | Mod: S$GLB,,, | Performed by: FAMILY MEDICINE

## 2019-09-05 PROCEDURE — 3078F PR MOST RECENT DIASTOLIC BLOOD PRESSURE < 80 MM HG: ICD-10-PCS | Mod: CPTII,S$GLB,, | Performed by: FAMILY MEDICINE

## 2019-09-05 PROCEDURE — 3078F DIAST BP <80 MM HG: CPT | Mod: CPTII,S$GLB,, | Performed by: FAMILY MEDICINE

## 2019-09-05 PROCEDURE — G0008 FLU VACCINE (QUAD) GREATER THAN OR EQUAL TO 3YO PRESERVATIVE FREE IM: ICD-10-PCS | Mod: S$GLB,,, | Performed by: FAMILY MEDICINE

## 2019-09-05 PROCEDURE — 3074F SYST BP LT 130 MM HG: CPT | Mod: CPTII,S$GLB,, | Performed by: FAMILY MEDICINE

## 2019-09-05 PROCEDURE — 90686 FLU VACCINE (QUAD) GREATER THAN OR EQUAL TO 3YO PRESERVATIVE FREE IM: ICD-10-PCS | Mod: S$GLB,,, | Performed by: FAMILY MEDICINE

## 2019-09-05 PROCEDURE — 90714 TD VACC NO PRESV 7 YRS+ IM: CPT | Mod: S$GLB,,, | Performed by: FAMILY MEDICINE

## 2019-09-05 PROCEDURE — 3008F PR BODY MASS INDEX (BMI) DOCUMENTED: ICD-10-PCS | Mod: CPTII,S$GLB,, | Performed by: FAMILY MEDICINE

## 2019-09-05 PROCEDURE — 90714 TD VACCINE GREATER THAN OR EQUAL TO 7YO PRESERVATIVE FREE IM: ICD-10-PCS | Mod: S$GLB,,, | Performed by: FAMILY MEDICINE

## 2019-09-05 PROCEDURE — 96372 PR INJECTION,THERAP/PROPH/DIAG2ST, IM OR SUBCUT: ICD-10-PCS | Mod: 59,S$GLB,, | Performed by: FAMILY MEDICINE

## 2019-09-05 PROCEDURE — G0008 ADMIN INFLUENZA VIRUS VAC: HCPCS | Mod: S$GLB,,, | Performed by: FAMILY MEDICINE

## 2019-09-05 PROCEDURE — 99999 PR PBB SHADOW E&M-EST. PATIENT-LVL IV: ICD-10-PCS | Mod: PBBFAC,,, | Performed by: FAMILY MEDICINE

## 2019-09-05 RX ORDER — MELOXICAM 7.5 MG/1
TABLET ORAL
Qty: 60 TABLET | Refills: 2 | Status: SHIPPED | OUTPATIENT
Start: 2019-09-05 | End: 2019-10-02

## 2019-09-05 RX ORDER — TRAZODONE HYDROCHLORIDE 50 MG/1
50 TABLET ORAL NIGHTLY
Qty: 30 TABLET | Refills: 11 | Status: SHIPPED | OUTPATIENT
Start: 2019-09-05 | End: 2019-12-03 | Stop reason: SDUPTHER

## 2019-09-05 RX ORDER — METHYLPREDNISOLONE 4 MG/1
TABLET ORAL
Qty: 1 PACKAGE | Refills: 0 | Status: SHIPPED | OUTPATIENT
Start: 2019-09-05 | End: 2019-09-17 | Stop reason: ALTCHOICE

## 2019-09-05 RX ORDER — CYCLOBENZAPRINE HCL 10 MG
10 TABLET ORAL 3 TIMES DAILY PRN
Qty: 30 TABLET | Refills: 5 | Status: SHIPPED | OUTPATIENT
Start: 2019-09-05 | End: 2019-09-15

## 2019-09-05 RX ORDER — TRIAMCINOLONE ACETONIDE 40 MG/ML
40 INJECTION, SUSPENSION INTRA-ARTICULAR; INTRAMUSCULAR ONCE
Status: COMPLETED | OUTPATIENT
Start: 2019-09-05 | End: 2019-09-05

## 2019-09-05 RX ORDER — HYDROCODONE BITARTRATE AND ACETAMINOPHEN 5; 325 MG/1; MG/1
1 TABLET ORAL EVERY 6 HOURS PRN
Qty: 30 TABLET | Refills: 0 | Status: SHIPPED | OUTPATIENT
Start: 2019-09-05 | End: 2019-10-02 | Stop reason: SDUPTHER

## 2019-09-05 RX ORDER — CLOTRIMAZOLE AND BETAMETHASONE DIPROPIONATE 10; .64 MG/G; MG/G
CREAM TOPICAL 2 TIMES DAILY
Qty: 45 G | Refills: 1 | Status: SHIPPED | OUTPATIENT
Start: 2019-09-05 | End: 2019-11-26

## 2019-09-05 RX ADMIN — TRIAMCINOLONE ACETONIDE 40 MG: 40 INJECTION, SUSPENSION INTRA-ARTICULAR; INTRAMUSCULAR at 04:09
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
## 2019-09-05 NOTE — PROGRESS NOTES
Verified pt ID using name and . NKDA. Administered TD vaccine in left deltoid, Influenza vaccine in right deltoid, and Kenalog 40 mg right VG per physician order using aseptic technique. Aspirated and no blood return noted. Pt tolerated well with no adverse reactions noted.

## 2019-09-11 ENCOUNTER — TELEPHONE (OUTPATIENT)
Dept: PRIMARY CARE CLINIC | Facility: CLINIC | Age: 59
End: 2019-09-11

## 2019-09-11 NOTE — TELEPHONE ENCOUNTER
----- Message from Estefany Miner sent at 9/11/2019  8:35 AM CDT -----  Contact: [phone]  Patient is requesting a call from the office in regards to getting an MRI for his neck.  Please advise, thanks

## 2019-09-16 ENCOUNTER — TELEPHONE (OUTPATIENT)
Dept: PRIMARY CARE CLINIC | Facility: CLINIC | Age: 59
End: 2019-09-16

## 2019-09-16 NOTE — TELEPHONE ENCOUNTER
Spoke with patient, verbalized that he would need an appointment. Appointment scheduled for 9/17/19 at 8:00. Patient verbalized understanding.

## 2019-09-16 NOTE — TELEPHONE ENCOUNTER
----- Message from Sheila Kenia sent at 9/16/2019  9:17 AM CDT -----  Contact: Patient  Type: Needs Medical Advice    Who Called:  Willy, patient  Symptoms (please be specific):  Cough  How long has patient had these symptoms:  A week, since he had the flu and pneumonia vaccines  Pharmacy name and phone #:    [id number] - [place] - [address] AT [facility] [address]  [address] [place] [address]  Phone: [phone] Fax: [phone]  Best Call Back Number: [phone]  Additional Information: Calling to ask for a prescription for cough syrup. Please advise. Thanks.

## 2019-09-17 ENCOUNTER — OFFICE VISIT (OUTPATIENT)
Dept: PRIMARY CARE CLINIC | Facility: CLINIC | Age: 59
End: 2019-09-17
Payer: MEDICARE

## 2019-09-17 VITALS
SYSTOLIC BLOOD PRESSURE: 109 MMHG | DIASTOLIC BLOOD PRESSURE: 69 MMHG | WEIGHT: 266.13 LBS | OXYGEN SATURATION: 98 % | HEIGHT: 72 IN | HEART RATE: 82 BPM | RESPIRATION RATE: 18 BRPM | TEMPERATURE: 98 F | BODY MASS INDEX: 36.04 KG/M2

## 2019-09-17 DIAGNOSIS — E66.3 OVERWEIGHT: ICD-10-CM

## 2019-09-17 DIAGNOSIS — M47.22 OSTEOARTHRITIS OF SPINE WITH RADICULOPATHY, CERVICAL REGION: ICD-10-CM

## 2019-09-17 DIAGNOSIS — I10 HYPERTENSION, UNSPECIFIED TYPE: ICD-10-CM

## 2019-09-17 DIAGNOSIS — M50.30 DDD (DEGENERATIVE DISC DISEASE), CERVICAL: Primary | ICD-10-CM

## 2019-09-17 DIAGNOSIS — Z11.59 NEED FOR HEPATITIS C SCREENING TEST: ICD-10-CM

## 2019-09-17 DIAGNOSIS — L25.9 CONTACT DERMATITIS, UNSPECIFIED CONTACT DERMATITIS TYPE, UNSPECIFIED TRIGGER: ICD-10-CM

## 2019-09-17 DIAGNOSIS — E78.5 HYPERLIPIDEMIA, UNSPECIFIED HYPERLIPIDEMIA TYPE: ICD-10-CM

## 2019-09-17 DIAGNOSIS — J40 BRONCHITIS: ICD-10-CM

## 2019-09-17 DIAGNOSIS — J32.9 SINUSITIS, UNSPECIFIED CHRONICITY, UNSPECIFIED LOCATION: ICD-10-CM

## 2019-09-17 PROCEDURE — 99999 PR PBB SHADOW E&M-EST. PATIENT-LVL III: ICD-10-PCS | Mod: PBBFAC,,, | Performed by: FAMILY MEDICINE

## 2019-09-17 PROCEDURE — 3008F BODY MASS INDEX DOCD: CPT | Mod: CPTII,S$GLB,, | Performed by: FAMILY MEDICINE

## 2019-09-17 PROCEDURE — 3074F PR MOST RECENT SYSTOLIC BLOOD PRESSURE < 130 MM HG: ICD-10-PCS | Mod: CPTII,S$GLB,, | Performed by: FAMILY MEDICINE

## 2019-09-17 PROCEDURE — 3078F PR MOST RECENT DIASTOLIC BLOOD PRESSURE < 80 MM HG: ICD-10-PCS | Mod: CPTII,S$GLB,, | Performed by: FAMILY MEDICINE

## 2019-09-17 PROCEDURE — 3074F SYST BP LT 130 MM HG: CPT | Mod: CPTII,S$GLB,, | Performed by: FAMILY MEDICINE

## 2019-09-17 PROCEDURE — 99999 PR PBB SHADOW E&M-EST. PATIENT-LVL III: CPT | Mod: PBBFAC,,, | Performed by: FAMILY MEDICINE

## 2019-09-17 PROCEDURE — 99214 OFFICE O/P EST MOD 30 MIN: CPT | Mod: 25,S$GLB,, | Performed by: FAMILY MEDICINE

## 2019-09-17 PROCEDURE — 3008F PR BODY MASS INDEX (BMI) DOCUMENTED: ICD-10-PCS | Mod: CPTII,S$GLB,, | Performed by: FAMILY MEDICINE

## 2019-09-17 PROCEDURE — 99214 PR OFFICE/OUTPT VISIT, EST, LEVL IV, 30-39 MIN: ICD-10-PCS | Mod: 25,S$GLB,, | Performed by: FAMILY MEDICINE

## 2019-09-17 PROCEDURE — 96372 PR INJECTION,THERAP/PROPH/DIAG2ST, IM OR SUBCUT: ICD-10-PCS | Mod: S$GLB,,, | Performed by: FAMILY MEDICINE

## 2019-09-17 PROCEDURE — 96372 THER/PROPH/DIAG INJ SC/IM: CPT | Mod: S$GLB,,, | Performed by: FAMILY MEDICINE

## 2019-09-17 PROCEDURE — 3078F DIAST BP <80 MM HG: CPT | Mod: CPTII,S$GLB,, | Performed by: FAMILY MEDICINE

## 2019-09-17 RX ORDER — LEVOFLOXACIN 500 MG/1
500 TABLET, FILM COATED ORAL DAILY
Qty: 10 TABLET | Refills: 0 | Status: SHIPPED | OUTPATIENT
Start: 2019-09-17 | End: 2019-09-27

## 2019-09-17 RX ORDER — PROMETHAZINE HYDROCHLORIDE AND CODEINE PHOSPHATE 6.25; 1 MG/5ML; MG/5ML
5 SOLUTION ORAL EVERY 6 HOURS PRN
Qty: 180 ML | Refills: 0 | Status: SHIPPED | OUTPATIENT
Start: 2019-09-17 | End: 2019-10-02

## 2019-09-17 RX ORDER — NYSTATIN 100000 [USP'U]/ML
5 SUSPENSION ORAL 4 TIMES DAILY
Qty: 200 ML | Refills: 0 | Status: SHIPPED | OUTPATIENT
Start: 2019-09-17 | End: 2019-09-27

## 2019-09-17 RX ORDER — CEFTRIAXONE 1 G/1
1 INJECTION, POWDER, FOR SOLUTION INTRAMUSCULAR; INTRAVENOUS
Status: COMPLETED | OUTPATIENT
Start: 2019-09-17 | End: 2019-09-17

## 2019-09-17 RX ORDER — ALBUTEROL SULFATE 90 UG/1
2 AEROSOL, METERED RESPIRATORY (INHALATION) EVERY 4 HOURS PRN
Qty: 1 INHALER | Refills: 5 | Status: SHIPPED | OUTPATIENT
Start: 2019-09-17 | End: 2019-10-02

## 2019-09-17 RX ADMIN — CEFTRIAXONE 1 G: 1 INJECTION, POWDER, FOR SOLUTION INTRAMUSCULAR; INTRAVENOUS at 08:09

## 2019-09-17 NOTE — PROGRESS NOTES
Verified pt ID using name and . NKDA. Administered Ceftriaxone 1 gram in left VG per physician order using aseptic technique. Aspirated and no blood return noted. Pt tolerated well with no adverse reactions noted.

## 2019-09-17 NOTE — PROGRESS NOTES
Subjective:       Patient ID: Willy Delgado is a 59 y.o. male.    Chief Complaint: Nasal Congestion and Cough    HPI:  59-year-old male --patient was seen 09/02/2019 here--had some musculoskeletal problems--was given flu shot and pneumonia shot---states has not been well sent.  Went to the emergency room 09/05/2019--was given injection--told had a viral syndrome.  +chills no fever, +runny nose, +sore throat, +cough, +phlegm--clear.  No pneumonia asthma TB.  No smoking      ROS:  Skin: no psoriasis, eczema, skin cancer--had a rash in area of the underwear better with cream --better   HEENT: no HA ,no  ocular pain,no   blurred vision, no diplopia, epistaxis, +occasional hoarseness +  change in voice, thyroid trouble  Lung: No pneumonia, asthma, Tb, SOB, no smoking   Heart: No chest pain, ankle edema, palpitations, MI, aureliano murmur, +hypertension blood pressure 109/69 + hyperlipidemia--no stent bypass arrhythmia  Abdomen: + nausea, vomiting, diarrhea, no  constipation, ulcers, hepatitis, gallbladder disease, melena, hematochezia, hematemesis  : no UTI, renal disease, stones, prostate  MS: no fractures,+ O/A--cervical spine pain, upper trapezius muscle pains --has appointment at [facility] with orthopedist 09/27/2019--patient call for therapy but not started till October 16th  Neuro: No dizziness, LOC, seizures   No diabetes, no anemia, no anxiety, no depression   , 5 children, works [employer] --lives alone w    Objective:   Physical Exam:  General: Well nourished, well developed, no acute distress + overweight  Skin: Hyperpigmented skin in the elastic band area of his underwear probable contact dermatitis with eczema  HEENT: Eyes PERRLA, EOM intact, gray ring around the eye-arcus senilis nose clear D/c ears TM clear   NECK: Supple, no bruits, No JVD, no nodes  Lungs: Clear, no rales, +rhonchi especially in the bronchial area no wheezes at this  Heart: Regular rate and rhythm, no murmurs,  gallops, or rubs  Abdomen: flat, bowel sounds positive, no tenderness, or organomegaly  MS:  No significant change since last visit Tenderness cervical spine C3 through 7 tenderness bilaterally with palpation--pain with lateral flexion neck to the right 5° left 10°, lateral rotation to the right 5° left 10° with spasm of the right upper trapezius muscle and cervical paraspinal muscles on the right tenderness in the right upper trapezius and right midscapular area pain especially with anterior flexion of the neck 5° extension to 5° with spasm some pain with raising right arm overhead pain with anterior posterior flexion shoulders  History of knee problems with arthritis supposed to have a knee replacement wants to lose 40 lb 1st saw Dr. Harley in the past   Neuro: Alert, CN intact, oriented X 3 Romberg negative heel-toe intact  Extremities: No cyanosis, clubbing, or edema         Assessment:       1. DDD (degenerative disc disease), cervical    2. Sinusitis, unspecified chronicity, unspecified location    3. Osteoarthritis of spine with radiculopathy, cervical region    4. Contact dermatitis, unspecified contact dermatitis type, unspecified trigger    5. Hypertension, unspecified type    6. Hyperlipidemia, unspecified hyperlipidemia type    7. Overweight    8. Bronchitis    9. Need for hepatitis C screening test        Plan:       DDD (degenerative disc disease), cervical    Sinusitis, unspecified chronicity, unspecified location    Osteoarthritis of spine with radiculopathy, cervical region    Contact dermatitis, unspecified contact dermatitis type, unspecified trigger    Hypertension, unspecified type    Hyperlipidemia, unspecified hyperlipidemia type    Overweight    Bronchitis  -     X-Ray Chest PA And Lateral; Future; Expected date: 09/17/2019    Need for hepatitis C screening test  -     Hepatitis C antibody; Future; Expected date: 09/17/2019    Other orders  -     promethazine-codeine 6.25-10 mg/5 ml  (PHENERGAN WITH CODEINE) 6.25-10 mg/5 mL syrup; Take 5 mLs by mouth every 6 (six) hours as needed for Cough.  Dispense: 180 mL; Refill: 0  -     nystatin (MYCOSTATIN) 100,000 unit/mL suspension; Take 5 mLs (500,000 Units total) by mouth 4 (four) times daily. for 10 days  Dispense: 200 mL; Refill: 0  -     levoFLOXacin (LEVAQUIN) 500 MG tablet; Take 1 tablet (500 mg total) by mouth once daily. for 10 days  Dispense: 10 tablet; Refill: 0  -     cefTRIAXone injection 1 g  -     albuterol (PROVENTIL/VENTOLIN HFA) 90 mcg/actuation inhaler; Inhale 2 puffs into the lungs every 4 (four) hours as needed for Wheezing or Shortness of Breath. Rescue  Dispense: 1 Inhaler; Refill: 5       Moist Heat , Tiger Balm , ROM exercises   Cold Rocephin, Levaquin, pred taper--Phenergan with codeine, nystatin gargle swish and swallow  Physical therapy to start October 16th --has appointment with [facility] orthopedist  Physical therapy ultrasonic treatment range of motion exercise Moist heat  Patient having difficulty sleeping--trazodone 50 mg 1 p.o. q.h.s. p.r.n. sleep  Patient to pain clinic for possible epidural steroid injection after MRI done  Health maintenance hepatitis C   Return 1 week if not--if begins wheezing will add albuterol  Pulmonary some the emergency room for possible admit  If Heartburn gets worse add omeprazole

## 2019-09-23 ENCOUNTER — TELEPHONE (OUTPATIENT)
Dept: RHEUMATOLOGY | Facility: CLINIC | Age: 59
End: 2019-09-23

## 2019-09-27 ENCOUNTER — OFFICE VISIT (OUTPATIENT)
Dept: RHEUMATOLOGY | Facility: CLINIC | Age: 59
End: 2019-09-27
Payer: MEDICARE

## 2019-09-27 VITALS
DIASTOLIC BLOOD PRESSURE: 82 MMHG | HEIGHT: 72 IN | BODY MASS INDEX: 37.93 KG/M2 | WEIGHT: 280 LBS | HEART RATE: 94 BPM | SYSTOLIC BLOOD PRESSURE: 114 MMHG

## 2019-09-27 DIAGNOSIS — M54.2 NECK PAIN, CHRONIC: Primary | ICD-10-CM

## 2019-09-27 DIAGNOSIS — M47.22 OSTEOARTHRITIS OF SPINE WITH RADICULOPATHY, CERVICAL REGION: ICD-10-CM

## 2019-09-27 DIAGNOSIS — S12.9XXA CLOSED FRACTURE OF CERVICAL VERTEBRA, UNSPECIFIED CERVICAL VERTEBRAL LEVEL, INITIAL ENCOUNTER: ICD-10-CM

## 2019-09-27 DIAGNOSIS — M85.9 DISORDER OF BONE DENSITY AND STRUCTURE, UNSPECIFIED: ICD-10-CM

## 2019-09-27 DIAGNOSIS — G89.29 NECK PAIN, CHRONIC: Primary | ICD-10-CM

## 2019-09-27 DIAGNOSIS — M50.30 BULGING OF CERVICAL INTERVERTEBRAL DISC: ICD-10-CM

## 2019-09-27 PROCEDURE — 3079F PR MOST RECENT DIASTOLIC BLOOD PRESSURE 80-89 MM HG: ICD-10-PCS | Mod: CPTII,S$GLB,, | Performed by: INTERNAL MEDICINE

## 2019-09-27 PROCEDURE — 3008F PR BODY MASS INDEX (BMI) DOCUMENTED: ICD-10-PCS | Mod: CPTII,S$GLB,, | Performed by: INTERNAL MEDICINE

## 2019-09-27 PROCEDURE — 3008F BODY MASS INDEX DOCD: CPT | Mod: CPTII,S$GLB,, | Performed by: INTERNAL MEDICINE

## 2019-09-27 PROCEDURE — 99203 OFFICE O/P NEW LOW 30 MIN: CPT | Mod: S$GLB,,, | Performed by: INTERNAL MEDICINE

## 2019-09-27 PROCEDURE — 3079F DIAST BP 80-89 MM HG: CPT | Mod: CPTII,S$GLB,, | Performed by: INTERNAL MEDICINE

## 2019-09-27 PROCEDURE — 3074F SYST BP LT 130 MM HG: CPT | Mod: CPTII,S$GLB,, | Performed by: INTERNAL MEDICINE

## 2019-09-27 PROCEDURE — 99999 PR PBB SHADOW E&M-EST. PATIENT-LVL IV: CPT | Mod: PBBFAC,,, | Performed by: INTERNAL MEDICINE

## 2019-09-27 PROCEDURE — 99203 PR OFFICE/OUTPT VISIT, NEW, LEVL III, 30-44 MIN: ICD-10-PCS | Mod: S$GLB,,, | Performed by: INTERNAL MEDICINE

## 2019-09-27 PROCEDURE — 3074F PR MOST RECENT SYSTOLIC BLOOD PRESSURE < 130 MM HG: ICD-10-PCS | Mod: CPTII,S$GLB,, | Performed by: INTERNAL MEDICINE

## 2019-09-27 PROCEDURE — 99999 PR PBB SHADOW E&M-EST. PATIENT-LVL IV: ICD-10-PCS | Mod: PBBFAC,,, | Performed by: INTERNAL MEDICINE

## 2019-09-27 RX ORDER — CYCLOBENZAPRINE HCL 10 MG
10 TABLET ORAL 3 TIMES DAILY PRN
COMMUNITY
End: 2019-10-02 | Stop reason: SDUPTHER

## 2019-09-27 ASSESSMENT — ROUTINE ASSESSMENT OF PATIENT INDEX DATA (RAPID3)
MDHAQ FUNCTION SCORE: 1
FATIGUE SCORE: 7
PATIENT GLOBAL ASSESSMENT SCORE: 7
PSYCHOLOGICAL DISTRESS SCORE: 1.1
TOTAL RAPID3 SCORE: 6.61
WHEN YOU AWAKENED IN THE MORNING OVER THE LAST WEEK, PLEASE INDICATE THE AMOUNT OF TIME IT TAKES UNTIL YOU ARE AS LIMBER AS YOU WILL BE FOR THE DAY: 24 HRS
PAIN SCORE: 9.5
AM STIFFNESS SCORE: 1, YES
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
## 2019-09-27 NOTE — LETTER
September 29, 2019      Addy Workman MD  [address] [place] [address]           [facility]  [address]  [place] [address]  Phone: [phone]  Fax: [phone]          Patient: Willy Delgado   MR Number: [id number]   YOB: 1960   Date of Visit: 9/27/2019       Dear Dr. Addy Workman:    Thank you for referring Willy Delgado to me for evaluation. Attached you will find relevant portions of my assessment and plan of care.    If you have questions, please do not hesitate to call me. I look forward to following Willy Delgado along with you.    Sincerely,    Saturnino Garza MD    Enclosure  CC:  No Recipients    If you would like to receive this communication electronically, please contact [email or web address] or [phone] to request more information on [employer] access.    For providers and/or their staff who would like to refer a patient to [facility], please contact us through our one-stop-shop provider referral line, [facility], at [phone].    If you feel you have received this communication in error or would no longer like to receive these types of communications, please e-mail [email or web address]

## 2019-09-27 NOTE — PATIENT INSTRUCTIONS
Continue to take your Naproxen twice daily.    Please follow-up with Dr. Acosta.    Please get your DXA scan.

## 2019-10-02 ENCOUNTER — OFFICE VISIT (OUTPATIENT)
Dept: PRIMARY CARE CLINIC | Facility: CLINIC | Age: 59
End: 2019-10-02
Payer: MEDICARE

## 2019-10-02 ENCOUNTER — TELEPHONE (OUTPATIENT)
Dept: NEUROSURGERY | Facility: CLINIC | Age: 59
End: 2019-10-02

## 2019-10-02 VITALS
HEIGHT: 72 IN | OXYGEN SATURATION: 97 % | RESPIRATION RATE: 18 BRPM | WEIGHT: 269 LBS | DIASTOLIC BLOOD PRESSURE: 86 MMHG | TEMPERATURE: 98 F | HEART RATE: 79 BPM | SYSTOLIC BLOOD PRESSURE: 123 MMHG | BODY MASS INDEX: 36.44 KG/M2

## 2019-10-02 DIAGNOSIS — M47.22 OSTEOARTHRITIS OF SPINE WITH RADICULOPATHY, CERVICAL REGION: ICD-10-CM

## 2019-10-02 DIAGNOSIS — E66.3 OVERWEIGHT: ICD-10-CM

## 2019-10-02 DIAGNOSIS — E78.5 HYPERLIPIDEMIA, UNSPECIFIED HYPERLIPIDEMIA TYPE: ICD-10-CM

## 2019-10-02 DIAGNOSIS — I10 HYPERTENSION, UNSPECIFIED TYPE: ICD-10-CM

## 2019-10-02 DIAGNOSIS — S16.1XXD STRAIN OF NECK MUSCLE, SUBSEQUENT ENCOUNTER: Primary | ICD-10-CM

## 2019-10-02 DIAGNOSIS — M50.30 DDD (DEGENERATIVE DISC DISEASE), CERVICAL: ICD-10-CM

## 2019-10-02 PROBLEM — J40 BRONCHITIS: Status: RESOLVED | Noted: 2018-01-26 | Resolved: 2019-10-02

## 2019-10-02 PROBLEM — J32.9 SINUSITIS: Status: RESOLVED | Noted: 2019-09-17 | Resolved: 2019-10-02

## 2019-10-02 PROCEDURE — 99999 PR PBB SHADOW E&M-EST. PATIENT-LVL IV: CPT | Mod: PBBFAC,,, | Performed by: FAMILY MEDICINE

## 2019-10-02 PROCEDURE — 3079F DIAST BP 80-89 MM HG: CPT | Mod: CPTII,S$GLB,, | Performed by: FAMILY MEDICINE

## 2019-10-02 PROCEDURE — 3079F PR MOST RECENT DIASTOLIC BLOOD PRESSURE 80-89 MM HG: ICD-10-PCS | Mod: CPTII,S$GLB,, | Performed by: FAMILY MEDICINE

## 2019-10-02 PROCEDURE — 3074F PR MOST RECENT SYSTOLIC BLOOD PRESSURE < 130 MM HG: ICD-10-PCS | Mod: CPTII,S$GLB,, | Performed by: FAMILY MEDICINE

## 2019-10-02 PROCEDURE — 3008F PR BODY MASS INDEX (BMI) DOCUMENTED: ICD-10-PCS | Mod: CPTII,S$GLB,, | Performed by: FAMILY MEDICINE

## 2019-10-02 PROCEDURE — 99214 PR OFFICE/OUTPT VISIT, EST, LEVL IV, 30-39 MIN: ICD-10-PCS | Mod: S$GLB,,, | Performed by: FAMILY MEDICINE

## 2019-10-02 PROCEDURE — 99214 OFFICE O/P EST MOD 30 MIN: CPT | Mod: S$GLB,,, | Performed by: FAMILY MEDICINE

## 2019-10-02 PROCEDURE — 3074F SYST BP LT 130 MM HG: CPT | Mod: CPTII,S$GLB,, | Performed by: FAMILY MEDICINE

## 2019-10-02 PROCEDURE — 3008F BODY MASS INDEX DOCD: CPT | Mod: CPTII,S$GLB,, | Performed by: FAMILY MEDICINE

## 2019-10-02 PROCEDURE — 99999 PR PBB SHADOW E&M-EST. PATIENT-LVL IV: ICD-10-PCS | Mod: PBBFAC,,, | Performed by: FAMILY MEDICINE

## 2019-10-02 RX ORDER — HYDROCODONE BITARTRATE AND ACETAMINOPHEN 5; 325 MG/1; MG/1
1 TABLET ORAL EVERY 6 HOURS PRN
Qty: 30 TABLET | Refills: 0 | Status: SHIPPED | OUTPATIENT
Start: 2019-10-02 | End: 2019-10-16 | Stop reason: SDUPTHER

## 2019-10-02 RX ORDER — CYCLOBENZAPRINE HCL 10 MG
10 TABLET ORAL 3 TIMES DAILY PRN
Qty: 30 TABLET | Refills: 5 | Status: SHIPPED | OUTPATIENT
Start: 2019-10-02 | End: 2020-01-06

## 2019-10-02 RX ORDER — OMEPRAZOLE 40 MG/1
40 CAPSULE, DELAYED RELEASE ORAL DAILY
Qty: 30 CAPSULE | Refills: 5 | Status: SHIPPED | OUTPATIENT
Start: 2019-10-02 | End: 2020-10-01

## 2019-10-02 RX ORDER — NAPROXEN 500 MG/1
500 TABLET ORAL 2 TIMES DAILY
COMMUNITY
End: 2019-10-16

## 2019-10-02 RX ORDER — IBUPROFEN 600 MG/1
600 TABLET ORAL EVERY 6 HOURS PRN
Qty: 100 TABLET | Refills: 5 | Status: SHIPPED | OUTPATIENT
Start: 2019-10-02 | End: 2019-11-02

## 2019-10-02 NOTE — TELEPHONE ENCOUNTER
Uli Dykes I called her back to let her know the appt was already scheduled with a neurosurgeon on the 12/6/19 with
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
## 2019-10-02 NOTE — TELEPHONE ENCOUNTER
----- Message from Ar Alatorre sent at 10/2/2019 10:02 AM CDT -----  Contact: Dr ELIZABETH Workman/Jania at [phone]  Please call pt at [phone]    Dr Workman is requesting the patient to be seen sooner for cervical spine pain. MRI scan done 05/03/19    Thank you

## 2019-10-16 ENCOUNTER — OFFICE VISIT (OUTPATIENT)
Dept: PRIMARY CARE CLINIC | Facility: CLINIC | Age: 59
End: 2019-10-16
Payer: MEDICARE

## 2019-10-16 VITALS
HEART RATE: 69 BPM | SYSTOLIC BLOOD PRESSURE: 108 MMHG | HEIGHT: 72 IN | RESPIRATION RATE: 18 BRPM | DIASTOLIC BLOOD PRESSURE: 74 MMHG | TEMPERATURE: 98 F | BODY MASS INDEX: 36.57 KG/M2 | OXYGEN SATURATION: 97 % | WEIGHT: 270 LBS

## 2019-10-16 DIAGNOSIS — S46.911A STRAIN OF RIGHT SHOULDER, INITIAL ENCOUNTER: ICD-10-CM

## 2019-10-16 DIAGNOSIS — M67.814 BICEPS TENDINOSIS OF LEFT SHOULDER: ICD-10-CM

## 2019-10-16 DIAGNOSIS — S46.812D INFRASPINATUS TENDON TEAR, LEFT, SUBSEQUENT ENCOUNTER: ICD-10-CM

## 2019-10-16 DIAGNOSIS — M17.11 OSTEOARTHRITIS OF RIGHT KNEE, UNSPECIFIED OSTEOARTHRITIS TYPE: ICD-10-CM

## 2019-10-16 DIAGNOSIS — I10 HYPERTENSION, UNSPECIFIED TYPE: ICD-10-CM

## 2019-10-16 DIAGNOSIS — M50.30 DDD (DEGENERATIVE DISC DISEASE), CERVICAL: Primary | ICD-10-CM

## 2019-10-16 DIAGNOSIS — E78.5 HYPERLIPIDEMIA, UNSPECIFIED HYPERLIPIDEMIA TYPE: ICD-10-CM

## 2019-10-16 DIAGNOSIS — M47.22 OSTEOARTHRITIS OF SPINE WITH RADICULOPATHY, CERVICAL REGION: ICD-10-CM

## 2019-10-16 PROCEDURE — 3078F PR MOST RECENT DIASTOLIC BLOOD PRESSURE < 80 MM HG: ICD-10-PCS | Mod: CPTII,S$GLB,, | Performed by: FAMILY MEDICINE

## 2019-10-16 PROCEDURE — 3008F PR BODY MASS INDEX (BMI) DOCUMENTED: ICD-10-PCS | Mod: CPTII,S$GLB,, | Performed by: FAMILY MEDICINE

## 2019-10-16 PROCEDURE — 99213 PR OFFICE/OUTPT VISIT, EST, LEVL III, 20-29 MIN: ICD-10-PCS | Mod: S$GLB,,, | Performed by: FAMILY MEDICINE

## 2019-10-16 PROCEDURE — 3074F SYST BP LT 130 MM HG: CPT | Mod: CPTII,S$GLB,, | Performed by: FAMILY MEDICINE

## 2019-10-16 PROCEDURE — 99213 OFFICE O/P EST LOW 20 MIN: CPT | Mod: S$GLB,,, | Performed by: FAMILY MEDICINE

## 2019-10-16 PROCEDURE — 3074F PR MOST RECENT SYSTOLIC BLOOD PRESSURE < 130 MM HG: ICD-10-PCS | Mod: CPTII,S$GLB,, | Performed by: FAMILY MEDICINE

## 2019-10-16 PROCEDURE — 99999 PR PBB SHADOW E&M-EST. PATIENT-LVL IV: ICD-10-PCS | Mod: PBBFAC,,, | Performed by: FAMILY MEDICINE

## 2019-10-16 PROCEDURE — 3008F BODY MASS INDEX DOCD: CPT | Mod: CPTII,S$GLB,, | Performed by: FAMILY MEDICINE

## 2019-10-16 PROCEDURE — 3078F DIAST BP <80 MM HG: CPT | Mod: CPTII,S$GLB,, | Performed by: FAMILY MEDICINE

## 2019-10-16 PROCEDURE — 99999 PR PBB SHADOW E&M-EST. PATIENT-LVL IV: CPT | Mod: PBBFAC,,, | Performed by: FAMILY MEDICINE

## 2019-10-16 RX ORDER — MELOXICAM 7.5 MG/1
7.5 TABLET ORAL 2 TIMES DAILY PRN
COMMUNITY
End: 2019-10-16 | Stop reason: SDUPTHER

## 2019-10-16 RX ORDER — MELOXICAM 7.5 MG/1
7.5 TABLET ORAL 2 TIMES DAILY PRN
Qty: 180 TABLET | Refills: 1 | Status: SHIPPED | OUTPATIENT
Start: 2019-10-16 | End: 2019-11-01 | Stop reason: SDUPTHER

## 2019-10-16 RX ORDER — HYDROCODONE BITARTRATE AND ACETAMINOPHEN 5; 325 MG/1; MG/1
1 TABLET ORAL EVERY 6 HOURS PRN
Qty: 30 TABLET | Refills: 0 | Status: SHIPPED | OUTPATIENT
Start: 2019-10-16 | End: 2019-12-03
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
## 2019-11-01 ENCOUNTER — TELEPHONE (OUTPATIENT)
Dept: PRIMARY CARE CLINIC | Facility: CLINIC | Age: 59
End: 2019-11-01

## 2019-11-01 ENCOUNTER — OFFICE VISIT (OUTPATIENT)
Dept: PRIMARY CARE CLINIC | Facility: CLINIC | Age: 59
End: 2019-11-01
Payer: MEDICARE

## 2019-11-01 VITALS
TEMPERATURE: 97 F | RESPIRATION RATE: 18 BRPM | HEIGHT: 72 IN | SYSTOLIC BLOOD PRESSURE: 126 MMHG | OXYGEN SATURATION: 97 % | HEART RATE: 90 BPM | DIASTOLIC BLOOD PRESSURE: 80 MMHG | BODY MASS INDEX: 36.99 KG/M2 | WEIGHT: 273.13 LBS

## 2019-11-01 DIAGNOSIS — M50.30 DDD (DEGENERATIVE DISC DISEASE), CERVICAL: Primary | ICD-10-CM

## 2019-11-01 DIAGNOSIS — M50.30 DDD (DEGENERATIVE DISC DISEASE), CERVICAL: ICD-10-CM

## 2019-11-01 PROCEDURE — 3079F DIAST BP 80-89 MM HG: CPT | Mod: CPTII,S$GLB,, | Performed by: INTERNAL MEDICINE

## 2019-11-01 PROCEDURE — 3008F PR BODY MASS INDEX (BMI) DOCUMENTED: ICD-10-PCS | Mod: CPTII,S$GLB,, | Performed by: INTERNAL MEDICINE

## 2019-11-01 PROCEDURE — 3074F PR MOST RECENT SYSTOLIC BLOOD PRESSURE < 130 MM HG: ICD-10-PCS | Mod: CPTII,S$GLB,, | Performed by: INTERNAL MEDICINE

## 2019-11-01 PROCEDURE — 99213 PR OFFICE/OUTPT VISIT, EST, LEVL III, 20-29 MIN: ICD-10-PCS | Mod: S$GLB,,, | Performed by: INTERNAL MEDICINE

## 2019-11-01 PROCEDURE — 99213 OFFICE O/P EST LOW 20 MIN: CPT | Mod: S$GLB,,, | Performed by: INTERNAL MEDICINE

## 2019-11-01 PROCEDURE — 99999 PR PBB SHADOW E&M-EST. PATIENT-LVL III: ICD-10-PCS | Mod: PBBFAC,,, | Performed by: INTERNAL MEDICINE

## 2019-11-01 PROCEDURE — 3008F BODY MASS INDEX DOCD: CPT | Mod: CPTII,S$GLB,, | Performed by: INTERNAL MEDICINE

## 2019-11-01 PROCEDURE — 99999 PR PBB SHADOW E&M-EST. PATIENT-LVL III: CPT | Mod: PBBFAC,,, | Performed by: INTERNAL MEDICINE

## 2019-11-01 PROCEDURE — 3074F SYST BP LT 130 MM HG: CPT | Mod: CPTII,S$GLB,, | Performed by: INTERNAL MEDICINE

## 2019-11-01 PROCEDURE — 3079F PR MOST RECENT DIASTOLIC BLOOD PRESSURE 80-89 MM HG: ICD-10-PCS | Mod: CPTII,S$GLB,, | Performed by: INTERNAL MEDICINE

## 2019-11-01 RX ORDER — MELOXICAM 15 MG/1
15 TABLET ORAL DAILY
Qty: 30 TABLET | Refills: 1 | Status: SHIPPED | OUTPATIENT
Start: 2019-11-01 | End: 2019-11-01 | Stop reason: SDUPTHER

## 2019-11-01 RX ORDER — PRAVASTATIN SODIUM 20 MG/1
TABLET ORAL
Qty: 90 TABLET | Refills: 1 | Status: ON HOLD | OUTPATIENT
Start: 2019-11-01 | End: 2020-04-30 | Stop reason: HOSPADM

## 2019-11-01 RX ORDER — LISINOPRIL AND HYDROCHLOROTHIAZIDE 20; 25 MG/1; MG/1
1 TABLET ORAL DAILY
Qty: 90 TABLET | Refills: 1 | Status: ON HOLD | OUTPATIENT
Start: 2019-11-01 | End: 2020-04-30 | Stop reason: HOSPADM

## 2019-11-01 RX ORDER — TRAMADOL HYDROCHLORIDE 50 MG/1
50 TABLET ORAL EVERY 12 HOURS PRN
Qty: 45 TABLET | Refills: 0 | Status: SHIPPED | OUTPATIENT
Start: 2019-11-01 | End: 2019-12-03 | Stop reason: SDUPTHER

## 2019-11-01 NOTE — PROGRESS NOTES
Subjective:       Patient ID: Willy Delgado is a 59 y.o. male.    Chief Complaint: Neck Pain    HPI  Pt is here for F/U neck pain had MRI ddd with spinal stenoses has appt with ortho in 6 weeks pt ran out of med due increase in neck pian had to double dosage out of medds pt ha slong h/o neck pain use to see painmanagement before had injection in neck before which help but symptoms return  Pain radiate rt diamond rt upper ext pt sttaes back pian get worse this am  Review of Systems    Objective:      Physical Exam   Constitutional: He is oriented to person, place, and time. He appears well-developed and well-nourished. No distress.   HENT:   Head: Normocephalic and atraumatic.   Right Ear: External ear normal.   Left Ear: External ear normal.   Nose: Nose normal.   Mouth/Throat: Oropharynx is clear and moist. No oropharyngeal exudate.   Eyes: Pupils are equal, round, and reactive to light. Conjunctivae and EOM are normal. Right eye exhibits no discharge. Left eye exhibits no discharge.   Neck: Normal range of motion. Neck supple. No thyromegaly present.   Cardiovascular: Normal rate, regular rhythm, normal heart sounds and intact distal pulses. Exam reveals no gallop and no friction rub.   No murmur heard.  Pulmonary/Chest: Effort normal and breath sounds normal. No respiratory distress. He has no wheezes. He has no rales. He exhibits no tenderness.   Abdominal: Soft. Bowel sounds are normal. He exhibits no distension. There is no tenderness. There is no rebound and no guarding.   Musculoskeletal: Normal range of motion. He exhibits tenderness (tenderness with palpation in lower CS decrease ROM due to pain). He exhibits no edema or deformity.   Lymphadenopathy:     He has no cervical adenopathy.   Neurological: He is alert and oriented to person, place, and time.   Skin: Skin is warm and dry. Capillary refill takes less than 2 seconds. No rash noted. No erythema.   Psychiatric: He has a normal mood and affect. Judgment  and thought content normal.   Nursing note and vitals reviewed.      Assessment:       1. DDD (degenerative disc disease), cervical        Plan:       DDD (degenerative disc disease), cervical  -     Discontinue: meloxicam (MOBIC) 15 MG tablet; Take 1 tablet (15 mg total) by mouth once daily.  Dispense: 30 tablet; Refill: 1  -     traMADol (ULTRAM) 50 mg tablet; Take 1 tablet (50 mg total) by mouth every 12 (twelve) hours as needed for Pain.  Dispense: 45 tablet; Refill: 0  -     Ambulatory referral to Pain Clinic

## 2019-11-01 NOTE — TELEPHONE ENCOUNTER
Spoke with patient states she wants to get a cortisone shot due to neck pain. Advised him he will need to be seen. Made patient an appointment for today at 5pm.

## 2019-11-01 NOTE — TELEPHONE ENCOUNTER
----- Message from Zuleika Beverly sent at 11/1/2019  2:11 PM CDT -----  Contact: self/[phone]  Pt called in regard to getting a Cortizone shot due to having pain in his neck and shoulders.    please advise

## 2019-11-02 RX ORDER — MELOXICAM 15 MG/1
TABLET ORAL
Qty: 90 TABLET | Refills: 0 | Status: SHIPPED | OUTPATIENT
Start: 2019-11-02 | End: 2019-11-26

## 2019-11-26 ENCOUNTER — OFFICE VISIT (OUTPATIENT)
Dept: PAIN MEDICINE | Facility: CLINIC | Age: 59
End: 2019-11-26
Payer: MEDICARE

## 2019-11-26 VITALS
DIASTOLIC BLOOD PRESSURE: 88 MMHG | BODY MASS INDEX: 36.88 KG/M2 | HEIGHT: 72 IN | SYSTOLIC BLOOD PRESSURE: 133 MMHG | OXYGEN SATURATION: 97 % | HEART RATE: 92 BPM | WEIGHT: 272.31 LBS

## 2019-11-26 DIAGNOSIS — M47.812 CERVICAL SPONDYLOSIS: ICD-10-CM

## 2019-11-26 DIAGNOSIS — M50.30 DDD (DEGENERATIVE DISC DISEASE), CERVICAL: ICD-10-CM

## 2019-11-26 DIAGNOSIS — M75.41 ROTATOR CUFF IMPINGEMENT SYNDROME OF RIGHT SHOULDER: Primary | ICD-10-CM

## 2019-11-26 DIAGNOSIS — M54.12 CERVICAL RADICULOPATHY: ICD-10-CM

## 2019-11-26 PROCEDURE — 3079F PR MOST RECENT DIASTOLIC BLOOD PRESSURE 80-89 MM HG: ICD-10-PCS | Mod: CPTII,S$GLB,, | Performed by: PAIN MEDICINE

## 2019-11-26 PROCEDURE — 20610 DRAIN/INJ JOINT/BURSA W/O US: CPT | Mod: S$GLB,,, | Performed by: PAIN MEDICINE

## 2019-11-26 PROCEDURE — 3008F PR BODY MASS INDEX (BMI) DOCUMENTED: ICD-10-PCS | Mod: CPTII,S$GLB,, | Performed by: PAIN MEDICINE

## 2019-11-26 PROCEDURE — 99204 OFFICE O/P NEW MOD 45 MIN: CPT | Mod: 25,S$GLB,, | Performed by: PAIN MEDICINE

## 2019-11-26 PROCEDURE — 3008F BODY MASS INDEX DOCD: CPT | Mod: CPTII,S$GLB,, | Performed by: PAIN MEDICINE

## 2019-11-26 PROCEDURE — 99204 PR OFFICE/OUTPT VISIT, NEW, LEVL IV, 45-59 MIN: ICD-10-PCS | Mod: 25,S$GLB,, | Performed by: PAIN MEDICINE

## 2019-11-26 PROCEDURE — 20610 PR DRAIN/INJECT LARGE JOINT/BURSA: ICD-10-PCS | Mod: S$GLB,,, | Performed by: PAIN MEDICINE

## 2019-11-26 PROCEDURE — 99999 PR PBB SHADOW E&M-EST. PATIENT-LVL III: ICD-10-PCS | Mod: PBBFAC,,, | Performed by: PAIN MEDICINE

## 2019-11-26 PROCEDURE — 3079F DIAST BP 80-89 MM HG: CPT | Mod: CPTII,S$GLB,, | Performed by: PAIN MEDICINE

## 2019-11-26 PROCEDURE — 3075F SYST BP GE 130 - 139MM HG: CPT | Mod: CPTII,S$GLB,, | Performed by: PAIN MEDICINE

## 2019-11-26 PROCEDURE — 3075F PR MOST RECENT SYSTOLIC BLOOD PRESS GE 130-139MM HG: ICD-10-PCS | Mod: CPTII,S$GLB,, | Performed by: PAIN MEDICINE

## 2019-11-26 PROCEDURE — 99999 PR PBB SHADOW E&M-EST. PATIENT-LVL III: CPT | Mod: PBBFAC,,, | Performed by: PAIN MEDICINE

## 2019-11-26 RX ORDER — TRIAMCINOLONE ACETONIDE 40 MG/ML
40 INJECTION, SUSPENSION INTRA-ARTICULAR; INTRAMUSCULAR
Status: COMPLETED | OUTPATIENT
Start: 2019-11-26 | End: 2019-11-26

## 2019-11-26 RX ORDER — NAPROXEN 500 MG/1
500 TABLET ORAL 2 TIMES DAILY
COMMUNITY
End: 2020-01-06

## 2019-11-26 RX ADMIN — TRIAMCINOLONE ACETONIDE 40 MG: 40 INJECTION, SUSPENSION INTRA-ARTICULAR; INTRAMUSCULAR at 11:11

## 2019-11-26 NOTE — PROGRESS NOTES
Subjective:     Patient ID: Willy Delgado is a 59 y.o. male    Chief Complaint: Neck Pain (pt takes medication and has done PT . Has had injections in the past ); Shoulder Pain; Arm Pain; and Joint Pain      Referred by: Yanick Jalloh MD      HPI:    Initial Encounter (11/26/19):  Willy Delgado is a 59 y.o. male who presents today with chronic right-sided neck and shoulder pain. This pain has been present since July 2019.  No specific inciting event or injury noted.  The pain is located in the right shoulder and right lower cervical region.  The pain does not radiate distally in the arm.  Patient denies any associated weakness or bowel bladder dysfunction but does recur port decreased sensation to light touch in the right lateral shoulder arm and elbow.  Pain is constant and severe.  Patient underwent a right shoulder injection by another physician.  He reports complete relief of his pain for 6-8 days following this procedure. He also reports good relief with physical therapy.   This pain is described in detail below.    Physical Therapy:  Yes.  Currently attending.    Non-pharmacologic Treatment:  Rest helps         · TENS?  No    Pain Medications:         · Currently taking:  Flexeril, naproxen, tramadol    · Has tried in the past:  Norco, meloxicam    · Has not tried:  TCAs, SNRIs, anticonvulsants, topical creams    Blood thinners:  None    Interventional Therapies:  Right shoulder injection - complete relief of symptoms for 6-8 days    Relevant Surgeries:  None    Affecting sleep?  yes    Affecting daily activities? yes    Depressive symptoms? yes          · SI/HI? No    Work status: Employed    Pain Scores:    Best:       7/10  Worst:     10/10  Usually:   10/10  Today:    10/10    Review of Systems   Constitutional: Negative for activity change, appetite change, chills, fatigue, fever and unexpected weight change.   HENT: Negative for hearing loss.    Eyes: Negative for visual disturbance.   Respiratory:  Negative for chest tightness and shortness of breath.    Cardiovascular: Negative for chest pain.   Gastrointestinal: Negative for abdominal pain, constipation, diarrhea, nausea and vomiting.   Genitourinary: Negative for difficulty urinating.   Musculoskeletal: Positive for arthralgias, myalgias, neck pain and neck stiffness. Negative for back pain and gait problem.   Skin: Negative for rash.   Neurological: Positive for numbness. Negative for dizziness, weakness, light-headedness and headaches.   Psychiatric/Behavioral: Positive for sleep disturbance. Negative for hallucinations and suicidal ideas. The patient is not nervous/anxious.        Past Medical History:   Diagnosis Date    Bronchitis     DDD (degenerative disc disease), cervical     HLD (hyperlipidemia)     Hypertension     Osteoarthritis        Past Surgical History:   Procedure Laterality Date    RIGHT KNEE SX         Social History     Socioeconomic History    Marital status: Single     Spouse name: Not on file    Number of children: Not on file    Years of education: Not on file    Highest education level: Not on file   Occupational History    Not on file   Social Needs    Financial resource strain: Not on file    Food insecurity:     Worry: Not on file     Inability: Not on file    Transportation needs:     Medical: Not on file     Non-medical: Not on file   Tobacco Use    Smoking status: Never Smoker    Smokeless tobacco: Never Used   Substance and Sexual Activity    Alcohol use: No    Drug use: No    Sexual activity: Yes   Lifestyle    Physical activity:     Days per week: Not on file     Minutes per session: Not on file    Stress: Not on file   Relationships    Social connections:     Talks on phone: Not on file     Gets together: Not on file     Attends L[employer]-day service: Not on file     Active member of club or organization: Not on file     Attends meetings of clubs or organizations: Not on file     Relationship status: Not  on file   Other Topics Concern    Not on file   Social History Narrative    Not on file       Review of patient's allergies indicates:  No Known Allergies    Current Outpatient Medications on File Prior to Visit   Medication Sig Dispense Refill    cyclobenzaprine (FLEXERIL) 10 MG tablet Take 1 tablet (10 mg total) by mouth 3 (three) times daily as needed for Muscle spasms. 30 tablet 5    HYDROcodone-acetaminophen (NORCO) 5-325 mg per tablet Take 1 tablet by mouth every 6 (six) hours as needed. 30 tablet 0    lisinopril-hydrochlorothiazide (PRINZIDE,ZESTORETIC) 20-25 mg Tab TAKE 1 TABLET BY MOUTH ONCE DAILY 90 tablet 1    naproxen (NAPROSYN) 500 MG tablet Take 500 mg by mouth 2 (two) times daily.      omeprazole (PRILOSEC) 40 MG capsule Take 1 capsule (40 mg total) by mouth once daily. 30 capsule 5    pravastatin (PRAVACHOL) 20 MG tablet TAKE 1 TABLET BY MOUTH ONCE DAILY 90 tablet 1    traMADol (ULTRAM) 50 mg tablet Take 1 tablet (50 mg total) by mouth every 12 (twelve) hours as needed for Pain. 45 tablet 0    traZODone (DESYREL) 50 MG tablet Take 1 tablet (50 mg total) by mouth every evening. 30 tablet 11    [DISCONTINUED] clotrimazole-betamethasone 1-0.05% (LOTRISONE) cream Apply topically 2 (two) times daily. (Patient not taking: Reported on 11/26/2019) 45 g 1    [DISCONTINUED] meloxicam (MOBIC) 15 MG tablet TAKE 1 TABLET(15 MG) BY MOUTH EVERY DAY (Patient not taking: Reported on 11/26/2019) 90 tablet 0     No current facility-administered medications on file prior to visit.        Objective:      /88   Pulse 92   Ht 6' (1.829 m)   Wt 123.5 kg (272 lb 4.8 oz)   SpO2 97%   BMI 36.93 kg/m²     Exam:  GEN:  Well developed, well nourished.  No acute distress.  Normal pain behavior.  HEENT:  No trauma.  Mucous membranes moist.  Nares patent bilaterally.  PSYCH: Normal affect. Thought content appropriate.  CHEST:  Breathing symmetric.  No audible wheezing.  ABD: Soft, non-distended.  SKIN:  Warm,  pink, dry.  No rash on exposed areas.    EXT:  No cyanosis, clubbing, or edema.  No color change or changes in nail or hair growth.  NEURO/MUSCULOSKELETAL:  Fully alert, oriented, and appropriate. Speech normal snow. No cranial nerve deficits.   Gait:   normal.  5/5 motor strength throughout upper extremities.   Sensory:   decreased sensation light touch in the right C5 and C6 dermatomes; otherwise no sensory deficit in the upper extremities.   Reflexes:   2 + and symmetric throughout.   absent  Lopez's bilaterally.  C-Spine:   full  ROM with pain on  flexion and extension.  negative  facet loading bilaterally.   negative  Spurling's bilaterally.    No  TTP over cervical facet joints, cervical paraspinal muscles, shoulders, elbows or hands.      Active range of motion of right shoulder flexion and extension limited to roughly 120°  Nolasco positive on the right      Imaging:  Narrative     EXAMINATION:  MRI CERVICAL SPINE WITHOUT CONTRAST    CLINICAL HISTORY:  DDD cervical 4-5/cervical spondylosis/cervical strain with radiculopathy left arm bilateral upper trapezius pain;.  Strain of muscle, fascia and tendon at neck level, subsequent encounter    TECHNIQUE:  Sagittal and axial T1 and T2 W images    COMPARISON:  MRI cervical spine 09/27/2011.    FINDINGS:  Cervical alignment is anatomic.  Mild chronic compression deformities with concavity of the superior and inferior endplates C3, mild loss of height/compression deformity C5 and C6 noted unchanged..    Degenerative desiccation and loss of height of the C4-5 disc noted with desiccation remaining cervical discs.    Relatively narrowed AP dimensions of the cervical central spinal canal C2-3 through C6-7 probably in large part on a developmental basis.    C3-4, mild to moderate bilateral facet arthropathy.  Mild posterior spondylosis may be present with a mildly flattened appearance of ventral cord noted.    C4-5, mild posterior spondylosis and bulging of the disc  are present superimposed on the developmentally modest canal with uncinate hypertrophy and secondary left foraminal narrowing.    C5-6, mild posterior spondylosis and bulging of the disc.  Mild to moderate left facet arthropathy.    C6-7, mild posterior spondylosis and bulging of the disc.    No disc herniation, canal stenosis or foraminal compromise appreciated remaining cervical or visualized upper thoracic disc levels.  The cervical cord maintains a normal signal throughout.    Rounded T2 hyperintensity inferior articular process left C5 likely degenerative type cyst.  No focal marrow lesion appreciated otherwise.   Impression       Mild chronic compression deformities C3, C5 and C6, unchanged.    Modest AP dimensions of the cervical central spinal canal C2-3 through C6-7 probably in large part on a developmental basis with superimposed degenerative change including posterior spondylosis and/or bulging of the C3-4, C4-5, C5-6 and C6-7 discs as detailed above and with associated mildly flattened appearance of the ventral cord C3-4 and C4-5, unchanged.  Uncinate hypertrophy and mild foraminal narrowing also noted left C4-5.    Mild to moderate facet arthropathy bilateral C3-4 and left C5-6.      Electronically signed by: Rocío Aggarwal MD  Date: 05/09/2019  Time: 12:01    Encounter     View Encounter                   Assessment:       Encounter Diagnoses   Name Primary?    Rotator cuff impingement syndrome of right shoulder Yes    DDD (degenerative disc disease), cervical     Cervical spondylosis     Cervical radiculopathy          Plan:       Willy was seen today for neck pain, shoulder pain, arm pain and joint pain.    Diagnoses and all orders for this visit:    Rotator cuff impingement syndrome of right shoulder  -     triamcinolone acetonide injection 40 mg    DDD (degenerative disc disease), cervical    Cervical spondylosis    Cervical radiculopathy        Willy Delgado is a 59 y.o. male with chronic  right-sided neck and right shoulder pain. Pain appears to be related to either right cervical radiculopathy or right rotator cuff pathology or both.  Complete pain relief but for short period of duration following right shoulder injection. Cervical MRI with evidence of right cervical radiculopathy.    1.  Pertinent imaging studies reviewed by me. Imaging results were discussed with patient.  2.  Right subacromial bursa steroid injection done today.  3.  If limited relief from right subacromial bursa steroid injection will consider performing C7-T1 interlaminar epidural steroid injection.  Patient was consented for this procedure during today's visit.  4.  Patient will call in about 2 weeks to report efficacy of right subacromial bursa steroid injection.      right subacromial bursa steroid injection:    Risks vs. benefits were discussed with patient and patient expressed understanding. Written consent was obtained. The right shoulder(s) was/were properly marked and cleaned with chlorprep. Using a 27 gauge 1.5 inch needle, 40mg kenalog and 2.0cc of 1% lidocaine were injected in the posterior approach after negative apiration. There was no bleeding or other complications. Patient reported immediate pain relief and improved ROM following injection(s).

## 2019-11-26 NOTE — LETTER
November 26, 2019      Yanick Jalloh MD  [address] [place] [address]           [facility]  [address]  [place] [address]  Phone: [phone]  Fax: [phone]          Patient: Willy Delgado   MR Number: [id number]   YOB: 1960   Date of Visit: 11/26/2019       Dear Dr. Yanick Jalloh:    Thank you for referring Willy Delgado to me for evaluation. Attached you will find relevant portions of my assessment and plan of care.    If you have questions, please do not hesitate to call me. I look forward to following Willy Delgado along with you.    Sincerely,    Idris Goldberg Jr., MD    Enclosure  CC:  No Recipients    If you would like to receive this communication electronically, please contact [email or web address] or [phone] to request more information on [employer] Video Link access.    For providers and/or their staff who would like to refer a patient to [facility], please contact us through our one-stop-shop provider referral line, [facility], at [phone].    If you feel you have received this communication in error or would no longer like to receive these types of communications, please e-mail [email or web address]

## 2019-12-03 ENCOUNTER — OFFICE VISIT (OUTPATIENT)
Dept: PRIMARY CARE CLINIC | Facility: CLINIC | Age: 59
End: 2019-12-03
Payer: MEDICARE

## 2019-12-03 VITALS
DIASTOLIC BLOOD PRESSURE: 72 MMHG | BODY MASS INDEX: 36.54 KG/M2 | TEMPERATURE: 98 F | OXYGEN SATURATION: 97 % | WEIGHT: 269.81 LBS | HEART RATE: 112 BPM | SYSTOLIC BLOOD PRESSURE: 136 MMHG | RESPIRATION RATE: 18 BRPM | HEIGHT: 72 IN

## 2019-12-03 DIAGNOSIS — E78.5 HYPERLIPIDEMIA, UNSPECIFIED HYPERLIPIDEMIA TYPE: ICD-10-CM

## 2019-12-03 DIAGNOSIS — M75.41 ROTATOR CUFF IMPINGEMENT SYNDROME OF RIGHT SHOULDER: ICD-10-CM

## 2019-12-03 DIAGNOSIS — M50.30 DDD (DEGENERATIVE DISC DISEASE), CERVICAL: ICD-10-CM

## 2019-12-03 DIAGNOSIS — E66.3 OVERWEIGHT: ICD-10-CM

## 2019-12-03 DIAGNOSIS — G47.00 INSOMNIA, UNSPECIFIED TYPE: ICD-10-CM

## 2019-12-03 DIAGNOSIS — S46.911A STRAIN OF RIGHT SHOULDER, INITIAL ENCOUNTER: Primary | ICD-10-CM

## 2019-12-03 DIAGNOSIS — I10 HYPERTENSION, UNSPECIFIED TYPE: ICD-10-CM

## 2019-12-03 DIAGNOSIS — S16.1XXD STRAIN OF NECK MUSCLE, SUBSEQUENT ENCOUNTER: ICD-10-CM

## 2019-12-03 DIAGNOSIS — K62.5 BRIGHT RED BLOOD PER RECTUM: ICD-10-CM

## 2019-12-03 DIAGNOSIS — M17.11 OSTEOARTHRITIS OF RIGHT KNEE, UNSPECIFIED OSTEOARTHRITIS TYPE: ICD-10-CM

## 2019-12-03 DIAGNOSIS — Z11.59 NEED FOR HEPATITIS C SCREENING TEST: ICD-10-CM

## 2019-12-03 DIAGNOSIS — L25.9 CONTACT DERMATITIS, UNSPECIFIED CONTACT DERMATITIS TYPE, UNSPECIFIED TRIGGER: ICD-10-CM

## 2019-12-03 PROCEDURE — 99213 PR OFFICE/OUTPT VISIT, EST, LEVL III, 20-29 MIN: ICD-10-PCS | Mod: S$GLB,,, | Performed by: FAMILY MEDICINE

## 2019-12-03 PROCEDURE — 99999 PR PBB SHADOW E&M-EST. PATIENT-LVL IV: CPT | Mod: PBBFAC,,, | Performed by: FAMILY MEDICINE

## 2019-12-03 PROCEDURE — 3078F DIAST BP <80 MM HG: CPT | Mod: CPTII,S$GLB,, | Performed by: FAMILY MEDICINE

## 2019-12-03 PROCEDURE — 99999 PR PBB SHADOW E&M-EST. PATIENT-LVL IV: ICD-10-PCS | Mod: PBBFAC,,, | Performed by: FAMILY MEDICINE

## 2019-12-03 PROCEDURE — 99213 OFFICE O/P EST LOW 20 MIN: CPT | Mod: S$GLB,,, | Performed by: FAMILY MEDICINE

## 2019-12-03 PROCEDURE — 3075F PR MOST RECENT SYSTOLIC BLOOD PRESS GE 130-139MM HG: ICD-10-PCS | Mod: CPTII,S$GLB,, | Performed by: FAMILY MEDICINE

## 2019-12-03 PROCEDURE — 99499 UNLISTED E&M SERVICE: CPT | Mod: S$GLB,,, | Performed by: FAMILY MEDICINE

## 2019-12-03 PROCEDURE — 3008F BODY MASS INDEX DOCD: CPT | Mod: CPTII,S$GLB,, | Performed by: FAMILY MEDICINE

## 2019-12-03 PROCEDURE — 3078F PR MOST RECENT DIASTOLIC BLOOD PRESSURE < 80 MM HG: ICD-10-PCS | Mod: CPTII,S$GLB,, | Performed by: FAMILY MEDICINE

## 2019-12-03 PROCEDURE — 3075F SYST BP GE 130 - 139MM HG: CPT | Mod: CPTII,S$GLB,, | Performed by: FAMILY MEDICINE

## 2019-12-03 PROCEDURE — 99499 RISK ADDL DX/OHS AUDIT: ICD-10-PCS | Mod: S$GLB,,, | Performed by: FAMILY MEDICINE

## 2019-12-03 PROCEDURE — 3008F PR BODY MASS INDEX (BMI) DOCUMENTED: ICD-10-PCS | Mod: CPTII,S$GLB,, | Performed by: FAMILY MEDICINE

## 2019-12-03 RX ORDER — TRAMADOL HYDROCHLORIDE 50 MG/1
50 TABLET ORAL EVERY 12 HOURS PRN
Qty: 45 TABLET | Refills: 0 | Status: SHIPPED | OUTPATIENT
Start: 2019-12-03 | End: 2019-12-13 | Stop reason: ALTCHOICE

## 2019-12-03 RX ORDER — TRAZODONE HYDROCHLORIDE 50 MG/1
50 TABLET ORAL NIGHTLY
Qty: 30 TABLET | Refills: 11 | Status: SHIPPED | OUTPATIENT
Start: 2019-12-03 | End: 2020-08-25
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
## 2019-12-13 ENCOUNTER — TELEPHONE (OUTPATIENT)
Dept: PAIN MEDICINE | Facility: CLINIC | Age: 59
End: 2019-12-13

## 2019-12-13 ENCOUNTER — OFFICE VISIT (OUTPATIENT)
Dept: PAIN MEDICINE | Facility: CLINIC | Age: 59
End: 2019-12-13
Payer: MEDICARE

## 2019-12-13 VITALS
BODY MASS INDEX: 36.86 KG/M2 | HEIGHT: 72 IN | HEART RATE: 91 BPM | WEIGHT: 272.13 LBS | SYSTOLIC BLOOD PRESSURE: 125 MMHG | TEMPERATURE: 99 F | OXYGEN SATURATION: 95 % | DIASTOLIC BLOOD PRESSURE: 85 MMHG

## 2019-12-13 DIAGNOSIS — M19.019 OSTEOARTHRITIS OF AC (ACROMIOCLAVICULAR) JOINT: ICD-10-CM

## 2019-12-13 DIAGNOSIS — M47.22 OSTEOARTHRITIS OF SPINE WITH RADICULOPATHY, CERVICAL REGION: Primary | ICD-10-CM

## 2019-12-13 DIAGNOSIS — M50.30 DDD (DEGENERATIVE DISC DISEASE), CERVICAL: ICD-10-CM

## 2019-12-13 DIAGNOSIS — M75.41 ROTATOR CUFF IMPINGEMENT SYNDROME OF RIGHT SHOULDER: ICD-10-CM

## 2019-12-13 PROCEDURE — 3074F PR MOST RECENT SYSTOLIC BLOOD PRESSURE < 130 MM HG: ICD-10-PCS | Mod: CPTII,S$GLB,, | Performed by: PAIN MEDICINE

## 2019-12-13 PROCEDURE — 99999 PR PBB SHADOW E&M-EST. PATIENT-LVL III: CPT | Mod: PBBFAC,,, | Performed by: PAIN MEDICINE

## 2019-12-13 PROCEDURE — 3008F BODY MASS INDEX DOCD: CPT | Mod: CPTII,S$GLB,, | Performed by: PAIN MEDICINE

## 2019-12-13 PROCEDURE — 3074F SYST BP LT 130 MM HG: CPT | Mod: CPTII,S$GLB,, | Performed by: PAIN MEDICINE

## 2019-12-13 PROCEDURE — 99214 OFFICE O/P EST MOD 30 MIN: CPT | Mod: S$GLB,,, | Performed by: PAIN MEDICINE

## 2019-12-13 PROCEDURE — 3008F PR BODY MASS INDEX (BMI) DOCUMENTED: ICD-10-PCS | Mod: CPTII,S$GLB,, | Performed by: PAIN MEDICINE

## 2019-12-13 PROCEDURE — 3079F DIAST BP 80-89 MM HG: CPT | Mod: CPTII,S$GLB,, | Performed by: PAIN MEDICINE

## 2019-12-13 PROCEDURE — 3079F PR MOST RECENT DIASTOLIC BLOOD PRESSURE 80-89 MM HG: ICD-10-PCS | Mod: CPTII,S$GLB,, | Performed by: PAIN MEDICINE

## 2019-12-13 PROCEDURE — 99999 PR PBB SHADOW E&M-EST. PATIENT-LVL III: ICD-10-PCS | Mod: PBBFAC,,, | Performed by: PAIN MEDICINE

## 2019-12-13 PROCEDURE — 99214 PR OFFICE/OUTPT VISIT, EST, LEVL IV, 30-39 MIN: ICD-10-PCS | Mod: S$GLB,,, | Performed by: PAIN MEDICINE

## 2019-12-13 RX ORDER — HYDROCODONE BITARTRATE AND ACETAMINOPHEN 5; 325 MG/1; MG/1
1 TABLET ORAL EVERY 6 HOURS PRN
Qty: 120 TABLET | Refills: 0 | Status: SHIPPED | OUTPATIENT
Start: 2019-12-13 | End: 2020-01-12

## 2019-12-13 NOTE — TELEPHONE ENCOUNTER
----- Message from Zohra Tremaine sent at 12/13/2019  2:58 PM CST -----  Contact: [facility] : [phone]  .Type: Patient Call Back    Who called: [facility]     What is the request in detail:  Pharmacy called to make  Aware that the pt already picked up a Rx of tramadol from another doctor    Can the clinic reply by [facility]? Call back     Would the patient rather a call back or a response via [facility]? Call back     Best call back number: [phone]
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
## 2019-12-16 ENCOUNTER — TELEPHONE (OUTPATIENT)
Dept: PAIN MEDICINE | Facility: CLINIC | Age: 59
End: 2019-12-16

## 2019-12-16 NOTE — TELEPHONE ENCOUNTER
Jaylen Willy called to inquire about scheduling procedure with Dr. Goldberg at [facility].  Message sent to Sundar to contact patient.

## 2019-12-16 NOTE — TELEPHONE ENCOUNTER
Left callback message. Calling to discuss if he has been evaluated by ortho and then possibly schedule a cervical SATISH.

## 2019-12-16 NOTE — TELEPHONE ENCOUNTER
----- Message from Tiffany Brunner sent at 12/16/2019  3:05 PM CST -----  Contact: Self [phone]  Pt states he is returning a call from nurse angelia please call back to discuss

## 2019-12-17 ENCOUNTER — TELEPHONE (OUTPATIENT)
Dept: SURGERY | Facility: CLINIC | Age: 59
End: 2019-12-17

## 2019-12-17 NOTE — TELEPHONE ENCOUNTER
Called the patient in reference to a referral to Ambulatory Colorectal Surgery. Patient refused first available appointment due to a conflict in appointment. Appointment scheduled for 01/27/2020.No further issues were discussed.

## 2019-12-18 ENCOUNTER — TELEPHONE (OUTPATIENT)
Dept: ORTHOPEDICS | Facility: CLINIC | Age: 59
End: 2019-12-18

## 2019-12-18 DIAGNOSIS — M25.511 RIGHT SHOULDER PAIN, UNSPECIFIED CHRONICITY: Primary | ICD-10-CM

## 2019-12-18 DIAGNOSIS — M25.512 LEFT SHOULDER PAIN, UNSPECIFIED CHRONICITY: Primary | ICD-10-CM

## 2019-12-18 NOTE — TELEPHONE ENCOUNTER
Called Pt. confirm appointment for 12/19/19 and to inform pt. that Imaging needs to be completed before appointment. Pt verblized understanding, orders are in. No further information discussed.

## 2019-12-18 NOTE — TELEPHONE ENCOUNTER
Spoke with patient about possibly scheduling an appointment for an SATISH. Patient will be in clinic tomorrow for visit with Dr Cárdenas in orthopedics. We will discuss the information from the ortho visit and discuss scheduling a procedure. Patient verbalized understanding of the information. No further issues discussed.

## 2019-12-19 ENCOUNTER — OFFICE VISIT (OUTPATIENT)
Dept: ORTHOPEDICS | Facility: CLINIC | Age: 59
End: 2019-12-19
Payer: MEDICARE

## 2019-12-19 VITALS
BODY MASS INDEX: 37.05 KG/M2 | SYSTOLIC BLOOD PRESSURE: 119 MMHG | HEART RATE: 83 BPM | WEIGHT: 273.56 LBS | DIASTOLIC BLOOD PRESSURE: 90 MMHG | HEIGHT: 72 IN

## 2019-12-19 DIAGNOSIS — M17.11 OSTEOARTHRITIS OF RIGHT KNEE, UNSPECIFIED OSTEOARTHRITIS TYPE: ICD-10-CM

## 2019-12-19 DIAGNOSIS — M50.30 DDD (DEGENERATIVE DISC DISEASE), CERVICAL: ICD-10-CM

## 2019-12-19 DIAGNOSIS — M47.22 OSTEOARTHRITIS OF SPINE WITH RADICULOPATHY, CERVICAL REGION: ICD-10-CM

## 2019-12-19 DIAGNOSIS — M67.814 BICEPS TENDINOSIS OF LEFT SHOULDER: ICD-10-CM

## 2019-12-19 DIAGNOSIS — S46.911A STRAIN OF RIGHT SHOULDER, INITIAL ENCOUNTER: ICD-10-CM

## 2019-12-19 DIAGNOSIS — M75.121 NONTRAUMATIC COMPLETE TEAR OF RIGHT ROTATOR CUFF: Primary | ICD-10-CM

## 2019-12-19 PROCEDURE — 3074F SYST BP LT 130 MM HG: CPT | Mod: CPTII,S$GLB,, | Performed by: ORTHOPAEDIC SURGERY

## 2019-12-19 PROCEDURE — 99999 PR PBB SHADOW E&M-EST. PATIENT-LVL V: CPT | Mod: PBBFAC,,, | Performed by: ORTHOPAEDIC SURGERY

## 2019-12-19 PROCEDURE — 3008F BODY MASS INDEX DOCD: CPT | Mod: CPTII,S$GLB,, | Performed by: ORTHOPAEDIC SURGERY

## 2019-12-19 PROCEDURE — 99204 PR OFFICE/OUTPT VISIT, NEW, LEVL IV, 45-59 MIN: ICD-10-PCS | Mod: S$GLB,,, | Performed by: ORTHOPAEDIC SURGERY

## 2019-12-19 PROCEDURE — 3008F PR BODY MASS INDEX (BMI) DOCUMENTED: ICD-10-PCS | Mod: CPTII,S$GLB,, | Performed by: ORTHOPAEDIC SURGERY

## 2019-12-19 PROCEDURE — 3080F PR MOST RECENT DIASTOLIC BLOOD PRESSURE >= 90 MM HG: ICD-10-PCS | Mod: CPTII,S$GLB,, | Performed by: ORTHOPAEDIC SURGERY

## 2019-12-19 PROCEDURE — 3074F PR MOST RECENT SYSTOLIC BLOOD PRESSURE < 130 MM HG: ICD-10-PCS | Mod: CPTII,S$GLB,, | Performed by: ORTHOPAEDIC SURGERY

## 2019-12-19 PROCEDURE — 99999 PR PBB SHADOW E&M-EST. PATIENT-LVL V: ICD-10-PCS | Mod: PBBFAC,,, | Performed by: ORTHOPAEDIC SURGERY

## 2019-12-19 PROCEDURE — 99204 OFFICE O/P NEW MOD 45 MIN: CPT | Mod: S$GLB,,, | Performed by: ORTHOPAEDIC SURGERY

## 2019-12-19 PROCEDURE — 3080F DIAST BP >= 90 MM HG: CPT | Mod: CPTII,S$GLB,, | Performed by: ORTHOPAEDIC SURGERY

## 2019-12-19 NOTE — PROGRESS NOTES
Orthopaedic Surgery History and Physical     History of present illness:   Willy Delgado is a 59 y.o. male who presents with 2 years of right shoulder pain. No trauma.  Reports a dull achy pain within the joint with sharp exacerbations with overhead activities.  Positive weakness. Today, patient has trialed several courses of physical therapy, NSAIDs, injections and activity modification with marginal relief of his symptoms. Positive neck pain. Pain interferes with his activities of daily    Allergies:   Review of patient's allergies indicates:  No Known Allergies    Past medical history:   Past Medical History:   Diagnosis Date    Bronchitis     DDD (degenerative disc disease), cervical     HLD (hyperlipidemia)     Hypertension     Osteoarthritis        Past surgical history:  Past Surgical History:   Procedure Laterality Date    RIGHT KNEE SX         Social history:   Reviewed per EPIC history for tobacco or alcohol use     Medications:    Current Outpatient Medications:     HYDROcodone-acetaminophen (NORCO) 5-325 mg per tablet, Take 1 tablet by mouth every 6 (six) hours as needed for Pain., Disp: 120 tablet, Rfl: 0    lisinopril-hydrochlorothiazide (PRINZIDE,ZESTORETIC) 20-25 mg Tab, TAKE 1 TABLET BY MOUTH ONCE DAILY, Disp: 90 tablet, Rfl: 1    pravastatin (PRAVACHOL) 20 MG tablet, TAKE 1 TABLET BY MOUTH ONCE DAILY, Disp: 90 tablet, Rfl: 1    traZODone (DESYREL) 50 MG tablet, Take 1 tablet (50 mg total) by mouth every evening., Disp: 30 tablet, Rfl: 11    cyclobenzaprine (FLEXERIL) 10 MG tablet, Take 1 tablet (10 mg total) by mouth 3 (three) times daily as needed for Muscle spasms. (Patient not taking: Reported on 12/19/2019), Disp: 30 tablet, Rfl: 5    naproxen (NAPROSYN) 500 MG tablet, Take 500 mg by mouth 2 (two) times daily., Disp: , Rfl:     omeprazole (PRILOSEC) 40 MG capsule, Take 1 capsule (40 mg total) by mouth once daily. (Patient not taking: Reported on 12/19/2019), Disp: 30 capsule, Rfl:  5    Review of systems:  Denies chest pain, palpitations, shortness of breath.   Denies excessive thirst, urination or heat or cold intolerance.   Denies nausea, vomiting, melena or hematochezia.    Denies fever, chills, night sweats, weight loss.    Denies dysuria or hematuria.   Denies history of anxiety or depression.   Denies any skin abnormalities or rash.   Denies upper or lower extremity paresthesias or lightheadedness.    Denies cough, shortness of breath or hemoptysis.     Physical Exam:   Vitals:    12/19/19 1344   BP: (!) 119/90   BP Location: Left arm   Patient Position: Sitting   BP Method: Large (Automatic)   Pulse: 83   Weight: 124.1 kg (273 lb 9.5 oz)   Height: 6' (1.829 m)     Awake/alert/oriented x3, No acute distress, Afebrile, Vital signs stable  Normocephalic, Atraumatic  Heart is beating at normal rate  Good inspiratory effort with unlaboured breathing  Abdomen soft/nondistended/nontender    Right upper extremity  Motor intact C5-T1  Sensation intact C5-T2  2+ brachial/radial/ulnar pulses  <2s CR refill to digits  4/5 supraspinatus  4/5 infraspinatus  Positive Hawkin's  Positive Neer's  Positive Mecosta's  Positive speed's test      Imaging:  Radiographs of the right shoulder demonstrates minimal glenohumeral degeneration with type 2 acromion, moderate AC arthropathy and mild superior elevation of the humeral head    MRI of the right shoulder demonstrates signal change consistent with full-thickness partial width tear of supraspinatus with mild retraction and signal change within the humeral heads footprint.  There is superior labral fraying/tear and significant biceps tendinosis with signal change into the intertubercular groove.  There is moderate AC arthropathy with significant changes in the joint space. Marginal thinning of the articular surfaces of the glenoid and humeral head    Assessment:   59 y.o. male with right shoulder rotator cuff tear    Plan:   Given failure of non operative  management, proceed to OR for right shoulder arthroscopy with RCR/SAD/DCE/open biceps tenodesis  Consent    Aureliano Cárdenas MD  [facility]

## 2019-12-19 NOTE — LETTER
December 19, 2019      Addy Workman MD  [address] [place] [address]           [facility]  [address], [facility] [address]  [place] [address]  Phone: [phone]  Fax: [phone]          Patient: Willy Delgado   MR Number: [id number]   YOB: 1960   Date of Visit: 12/19/2019       Dear Dr. Addy oWrkman:    Thank you for referring Willy Delgado to me for evaluation. Attached you will find relevant portions of my assessment and plan of care.    If you have questions, please do not hesitate to call me. I look forward to following Willy Delgado along with you.    Sincerely,    Aureliano Cárdenas MD    Enclosure  CC:  No Recipients    If you would like to receive this communication electronically, please contact [email or web address] or [phone] to request more information on [facility] Link access.    For providers and/or their staff who would like to refer a patient to [facility], please contact us through our one-stop-shop provider referral line, [facility], at [phone].    If you feel you have received this communication in error or would no longer like to receive these types of communications, please e-mail [email or web address]

## 2020-01-02 ENCOUNTER — TELEPHONE (OUTPATIENT)
Dept: ORTHOPEDICS | Facility: CLINIC | Age: 60
End: 2020-01-02

## 2020-01-02 NOTE — TELEPHONE ENCOUNTER
Spoke with the patient about his shoulder pain. Stated that the Norco and Mobic is not really touching the pain he is experiencing, asking to change the Norco to something stronger. Patient was made aware that the Norco was ordered by Dr Goldberg and that Dr Cárdenas would not order another pain medication before the scheduled surgery on 1/24/20. Informed patient that the pain medications would dull the pain he is experiencing but it will not entirely stop it, he stated that is about all the meds do is dull the pain. Instructed patient to try using a compress to his shoulder for added relief when he has an opportunity. Patient verbalized understanding of the information provided to him. No further issues discussed.

## 2020-01-13 ENCOUNTER — TELEPHONE (OUTPATIENT)
Dept: ORTHOPEDICS | Facility: CLINIC | Age: 60
End: 2020-01-13

## 2020-01-13 NOTE — TELEPHONE ENCOUNTER
Spoke with patient about the provider declining to refill the medication. Patient is aware of this matter. No further issues discussed.

## 2020-01-13 NOTE — TELEPHONE ENCOUNTER
Spoke with the patient about his pain medication. Stated that he took his last dose yesterday and the medication eased the pain some. He is asking if he could get a refill of his Norco to provide him some relief until his surgery with Dr Cárdenas on 1/24/20. Please advise.

## 2020-01-13 NOTE — TELEPHONE ENCOUNTER
----- Message from Ar Alatorre sent at 1/13/2020  9:26 AM CST -----  Contact: pt   Please call pt at [phone] or [phone]    Refill request for Norco 5-325 mg    Use [id number] [place] - [address] AT [facility] [address] [phone] (Phone)  [phone] (Fax)    Patient is out of medication     Thank you

## 2020-01-22 ENCOUNTER — TELEPHONE (OUTPATIENT)
Dept: SURGERY | Facility: CLINIC | Age: 60
End: 2020-01-22

## 2020-01-22 NOTE — TELEPHONE ENCOUNTER
Called patient to remind of appointment with Colorectal Surgery. Patient states I haven't had anymore problem, cancel the appointment.

## 2020-01-24 PROBLEM — M75.101 ROTATOR CUFF TEAR, RIGHT: Status: ACTIVE | Noted: 2020-01-24

## 2020-02-05 ENCOUNTER — TELEPHONE (OUTPATIENT)
Dept: ORTHOPEDICS | Facility: CLINIC | Age: 60
End: 2020-02-05

## 2020-02-05 NOTE — TELEPHONE ENCOUNTER
----- Message from Karen Silva sent at 2/5/2020  8:42 AM CST -----  Contact: pt#[phone]  Willy Delgado calling regarding Patient Advice (message) for shoulder pain since surgery. Pt wants to know if it's normal. Please call

## 2020-02-13 ENCOUNTER — OFFICE VISIT (OUTPATIENT)
Dept: ORTHOPEDICS | Facility: CLINIC | Age: 60
End: 2020-02-13
Payer: MEDICARE

## 2020-02-13 VITALS
WEIGHT: 268.94 LBS | OXYGEN SATURATION: 98 % | SYSTOLIC BLOOD PRESSURE: 120 MMHG | BODY MASS INDEX: 36.48 KG/M2 | DIASTOLIC BLOOD PRESSURE: 82 MMHG | HEART RATE: 77 BPM

## 2020-02-13 DIAGNOSIS — Z51.89 AFTERCARE: Primary | ICD-10-CM

## 2020-02-13 PROCEDURE — 99024 POSTOP FOLLOW-UP VISIT: CPT | Mod: S$GLB,,, | Performed by: ORTHOPAEDIC SURGERY

## 2020-02-13 PROCEDURE — 99999 PR PBB SHADOW E&M-EST. PATIENT-LVL III: CPT | Mod: PBBFAC,,, | Performed by: ORTHOPAEDIC SURGERY

## 2020-02-13 PROCEDURE — 99024 PR POST-OP FOLLOW-UP VISIT: ICD-10-PCS | Mod: S$GLB,,, | Performed by: ORTHOPAEDIC SURGERY

## 2020-02-13 PROCEDURE — 99999 PR PBB SHADOW E&M-EST. PATIENT-LVL III: ICD-10-PCS | Mod: PBBFAC,,, | Performed by: ORTHOPAEDIC SURGERY

## 2020-02-13 RX ORDER — OXYCODONE AND ACETAMINOPHEN 5; 325 MG/1; MG/1
1-2 TABLET ORAL EVERY 6 HOURS PRN
Qty: 91 TABLET | Refills: 0 | Status: SHIPPED | OUTPATIENT
Start: 2020-02-13 | End: 2020-04-27 | Stop reason: CLARIF

## 2020-02-13 NOTE — PROGRESS NOTES
Patient presents 2 weeks s/p Right shoulder rcr  Pain controlled on current regimen  No fever/chills  Compliant with restrictions      Sutures intact  Wound c/d/i  No si/sx of infection  NVI distally      2 weeks s/p Right shoulder RCR  NWB RUE  Continue sling, out of sling with PT only  Continue current pain regimen  RTC in 4 weeks

## 2020-02-18 ENCOUNTER — PATIENT OUTREACH (OUTPATIENT)
Dept: ADMINISTRATIVE | Facility: HOSPITAL | Age: 60
End: 2020-02-18

## 2020-02-20 PROBLEM — M25.611 DECREASED ROM OF RIGHT SHOULDER: Status: ACTIVE | Noted: 2020-02-20

## 2020-02-20 PROBLEM — R29.898 WEAKNESS OF RIGHT UPPER EXTREMITY: Status: ACTIVE | Noted: 2020-02-20

## 2020-03-06 DIAGNOSIS — Z51.89 AFTERCARE: Primary | ICD-10-CM

## 2020-03-12 ENCOUNTER — OFFICE VISIT (OUTPATIENT)
Dept: ORTHOPEDICS | Facility: CLINIC | Age: 60
End: 2020-03-12
Payer: MEDICARE

## 2020-03-12 VITALS
OXYGEN SATURATION: 96 % | WEIGHT: 271.19 LBS | SYSTOLIC BLOOD PRESSURE: 114 MMHG | DIASTOLIC BLOOD PRESSURE: 79 MMHG | HEART RATE: 92 BPM | HEIGHT: 72 IN | RESPIRATION RATE: 18 BRPM | BODY MASS INDEX: 36.73 KG/M2

## 2020-03-12 DIAGNOSIS — Z51.89 AFTER CARE: Primary | ICD-10-CM

## 2020-03-12 PROCEDURE — 99999 PR PBB SHADOW E&M-EST. PATIENT-LVL IV: ICD-10-PCS | Mod: PBBFAC,,, | Performed by: ORTHOPAEDIC SURGERY

## 2020-03-12 PROCEDURE — 99999 PR PBB SHADOW E&M-EST. PATIENT-LVL IV: CPT | Mod: PBBFAC,,, | Performed by: ORTHOPAEDIC SURGERY

## 2020-03-12 PROCEDURE — 99024 PR POST-OP FOLLOW-UP VISIT: ICD-10-PCS | Mod: S$GLB,,, | Performed by: ORTHOPAEDIC SURGERY

## 2020-03-12 PROCEDURE — 99024 POSTOP FOLLOW-UP VISIT: CPT | Mod: S$GLB,,, | Performed by: ORTHOPAEDIC SURGERY

## 2020-03-12 RX ORDER — HYDROCODONE BITARTRATE AND ACETAMINOPHEN 5; 325 MG/1; MG/1
1-2 TABLET ORAL EVERY 8 HOURS PRN
Qty: 71 TABLET | Refills: 0 | Status: SHIPPED | OUTPATIENT
Start: 2020-03-12

## 2020-03-12 NOTE — PROGRESS NOTES
Orthopaedic Surgery History and Physical     History of present illness:   Willy Delgado is a 59 y.o. male who presents with 6 weeks status post right shoulder arthroscopy with rotator cuff repair.  Patient has been working with physical therapy with good progression.  Patient has been compliant with weight-bearing restrictions and presents today in his original brace.  Pain is controlled on current regimen.  No fever.  No chills.    Allergies:   Review of patient's allergies indicates:  No Known Allergies    Past medical history:   Past Medical History:   Diagnosis Date    Bronchitis     Bulging of cervical intervertebral disc     Cervical radiculopathy     Cervical spondylosis     Chronic neck pain     Chronic sinusitis     DDD (degenerative disc disease), cervical     GERD (gastroesophageal reflux disease)     HLD (hyperlipidemia)     Hypertension     Osteoarthritis     Osteoarthritis of spine with radiculopathy     Right rotator cuff tear     Wears dentures        Past surgical history:  Past Surgical History:   Procedure Laterality Date    ARTHROSCOPIC REPAIR OF ROTATOR CUFF OF SHOULDER Right 1/24/2020    Procedure: REPAIR, ROTATOR CUFF, ARTHROSCOPIC, Loaiza and Nephew, beach chair, 1st assist;  Surgeon: Aureliano Cárdenas MD;  Location: [facility];  Service: Orthopedics;  Laterality: Right;  FIRST ASSISTANT CONFIRMED 1/17/DME  LOAIZA & NEPHEW CONFIRMED 1/22/DME    ARTHROSCOPY OF SHOULDER WITH DECOMPRESSION OF SUBACROMIAL SPACE Right 01/24/2020    DECOMPRESSION OF SUBACROMIAL SPACE Right 1/24/2020    Procedure: DECOMPRESSION, SUBACROMIAL SPACE;  Surgeon: Aureliano Cárdenas MD;  Location: [facility] OR;  Service: Orthopedics;  Laterality: Right;    KNEE SURGERY Right        Social history:   Reviewed per EPIC history for tobacco or alcohol use     Medications:    Current Outpatient Medications:     lisinopril-hydrochlorothiazide (PRINZIDE,ZESTORETIC) 20-25 mg Tab, TAKE 1 TABLET BY MOUTH ONCE  DAILY, Disp: 90 tablet, Rfl: 1    meloxicam (MOBIC) 7.5 MG tablet, Take 7.5 mg by mouth once daily., Disp: , Rfl:     omeprazole (PRILOSEC) 40 MG capsule, Take 1 capsule (40 mg total) by mouth once daily., Disp: 30 capsule, Rfl: 5    oxyCODONE-acetaminophen (PERCOCET) 5-325 mg per tablet, Take 1-2 tablets by mouth every 6 (six) hours as needed for Pain., Disp: 71 tablet, Rfl: 0    oxyCODONE-acetaminophen (PERCOCET) 5-325 mg per tablet, Take 1-2 tablets by mouth every 6 (six) hours as needed for Pain., Disp: 91 tablet, Rfl: 0    pravastatin (PRAVACHOL) 20 MG tablet, TAKE 1 TABLET BY MOUTH ONCE DAILY, Disp: 90 tablet, Rfl: 1    traZODone (DESYREL) 50 MG tablet, Take 1 tablet (50 mg total) by mouth every evening., Disp: 30 tablet, Rfl: 11    Review of systems:  Denies chest pain, palpitations, shortness of breath.   Denies excessive thirst, urination or heat or cold intolerance.   Denies nausea, vomiting, melena or hematochezia.    Denies fever, chills, night sweats, weight loss.    Denies dysuria or hematuria.   Denies history of anxiety or depression.   Denies any skin abnormalities or rash.   Denies upper or lower extremity paresthesias or lightheadedness.    Denies cough, shortness of breath or hemoptysis.     Physical Exam:   Vitals:    03/12/20 1114   BP: 114/79   BP Location: Left arm   Patient Position: Lying   BP Method: Large (Manual)   Pulse: 92   Resp: 18   SpO2: 96%   Weight: 123 kg (271 lb 2.7 oz)   Height: 6' (1.829 m)     Awake/alert/oriented x3, No acute distress, Afebrile, Vital signs stable  Normocephalic, Atraumatic  Heart is beating at normal rate  Good inspiratory effort with unlaboured breathing  Abdomen soft/nondistended/nontender    Right upper extremity  Motor intact C5-T1  Sensation intact C5-T2  2+ brachial/radial/ulnar pulses  <2s CR refill to digits  Right shoulder range of motion:  90° forward flexion, 90° abduction, 35° external rotation at 0°  3/5 supraspinatus    Assessment:   59  y.o. male with 6 weeks status post right shoulder arthroscopy with RCR    Plan:   Discharge from sling  Activity as tolerated  Physical therapy referral for outpatient therapy  Continue current pain regimen, prescription given the patient  Return to clinic in 6 weeks    Aureliano Cárdenas MD  [facility]

## 2020-04-15 ENCOUNTER — TELEPHONE (OUTPATIENT)
Dept: ORTHOPEDICS | Facility: CLINIC | Age: 60
End: 2020-04-15

## 2020-04-15 NOTE — TELEPHONE ENCOUNTER
----- Message from Christal Pace sent at 4/15/2020 11:44 AM CDT -----  Contact: PT  PT called asking if he can be seen before 4/23 if possible?    Callback: [phone] or [phone]

## 2020-04-15 NOTE — TELEPHONE ENCOUNTER
Patient verbalized understanding that nile is only in office every other Thursday and that a sooner appointment was unaavailable

## 2020-04-23 ENCOUNTER — OFFICE VISIT (OUTPATIENT)
Dept: ORTHOPEDICS | Facility: CLINIC | Age: 60
End: 2020-04-23
Payer: MEDICARE

## 2020-04-23 VITALS
SYSTOLIC BLOOD PRESSURE: 124 MMHG | WEIGHT: 265.56 LBS | BODY MASS INDEX: 36.01 KG/M2 | HEART RATE: 78 BPM | DIASTOLIC BLOOD PRESSURE: 88 MMHG

## 2020-04-23 DIAGNOSIS — Z51.89 AFTERCARE: Primary | ICD-10-CM

## 2020-04-23 PROCEDURE — 99024 POSTOP FOLLOW-UP VISIT: CPT | Mod: S$GLB,,, | Performed by: ORTHOPAEDIC SURGERY

## 2020-04-23 PROCEDURE — 99024 PR POST-OP FOLLOW-UP VISIT: ICD-10-PCS | Mod: S$GLB,,, | Performed by: ORTHOPAEDIC SURGERY

## 2020-04-23 PROCEDURE — 99999 PR PBB SHADOW E&M-EST. PATIENT-LVL III: ICD-10-PCS | Mod: PBBFAC,,, | Performed by: ORTHOPAEDIC SURGERY

## 2020-04-23 PROCEDURE — 99999 PR PBB SHADOW E&M-EST. PATIENT-LVL III: CPT | Mod: PBBFAC,,, | Performed by: ORTHOPAEDIC SURGERY

## 2020-04-23 RX ORDER — HYDROCODONE BITARTRATE AND ACETAMINOPHEN 5; 325 MG/1; MG/1
1-2 TABLET ORAL EVERY 8 HOURS PRN
Qty: 71 TABLET | Refills: 0 | Status: ON HOLD | OUTPATIENT
Start: 2020-04-23 | End: 2020-04-29 | Stop reason: HOSPADM

## 2020-04-23 NOTE — PROGRESS NOTES
Orthopaedic Surgery History and Physical     History of present illness:   Willy Delgado is a 59 y.o. male presents 3 months status post right shoulder arthroscopy with rotator cuff repair.  Patient has not participated in physical therapy for any more than 2 sessions where he was given home exercise program and announced meant that the physical therapy clinic would be closed until the pandemic response was over.  Patient has been consistently doing his physical therapy at home.  But has had slow progression as result of his knowledge base.     Allergies:   Review of patient's allergies indicates:  No Known Allergies    Past medical history:   Past Medical History:   Diagnosis Date    Bronchitis     Bulging of cervical intervertebral disc     Cervical radiculopathy     Cervical spondylosis     Chronic neck pain     Chronic sinusitis     DDD (degenerative disc disease), cervical     GERD (gastroesophageal reflux disease)     HLD (hyperlipidemia)     Hypertension     Osteoarthritis     Osteoarthritis of spine with radiculopathy     Right rotator cuff tear     Wears dentures        Past surgical history:  Past Surgical History:   Procedure Laterality Date    ARTHROSCOPIC REPAIR OF ROTATOR CUFF OF SHOULDER Right 1/24/2020    Procedure: REPAIR, ROTATOR CUFF, ARTHROSCOPIC, Loaiza and Nephew, beach chair, 1st assist;  Surgeon: Aureliano Cárdenas MD;  Location: [facility] OR;  Service: Orthopedics;  Laterality: Right;  FIRST ASSISTANT CONFIRMED 1/17/DME  LOAIZA & NEPHEW CONFIRMED 1/22/DME    ARTHROSCOPY OF SHOULDER WITH DECOMPRESSION OF SUBACROMIAL SPACE Right 01/24/2020    DECOMPRESSION OF SUBACROMIAL SPACE Right 1/24/2020    Procedure: DECOMPRESSION, SUBACROMIAL SPACE;  Surgeon: Aureliano Cárdenas MD;  Location: [facility] OR;  Service: Orthopedics;  Laterality: Right;    KNEE SURGERY Right        Social history:   Reviewed per EPIC history for tobacco or alcohol use     Medications:    Current Outpatient  Medications:     HYDROcodone-acetaminophen (NORCO) 5-325 mg per tablet, Take 1-2 tablets by mouth every 8 (eight) hours as needed for Pain., Disp: 71 tablet, Rfl: 0    lisinopril-hydrochlorothiazide (PRINZIDE,ZESTORETIC) 20-25 mg Tab, TAKE 1 TABLET BY MOUTH ONCE DAILY, Disp: 90 tablet, Rfl: 1    meloxicam (MOBIC) 7.5 MG tablet, Take 7.5 mg by mouth once daily., Disp: , Rfl:     omeprazole (PRILOSEC) 40 MG capsule, Take 1 capsule (40 mg total) by mouth once daily., Disp: 30 capsule, Rfl: 5    oxyCODONE-acetaminophen (PERCOCET) 5-325 mg per tablet, Take 1-2 tablets by mouth every 6 (six) hours as needed for Pain., Disp: 71 tablet, Rfl: 0    oxyCODONE-acetaminophen (PERCOCET) 5-325 mg per tablet, Take 1-2 tablets by mouth every 6 (six) hours as needed for Pain., Disp: 91 tablet, Rfl: 0    pravastatin (PRAVACHOL) 20 MG tablet, TAKE 1 TABLET BY MOUTH ONCE DAILY, Disp: 90 tablet, Rfl: 1    traZODone (DESYREL) 50 MG tablet, Take 1 tablet (50 mg total) by mouth every evening., Disp: 30 tablet, Rfl: 11    HYDROcodone-acetaminophen (NORCO) 5-325 mg per tablet, Take 1-2 tablets by mouth every 8 (eight) hours as needed for Pain., Disp: 71 tablet, Rfl: 0    Review of systems:  Denies chest pain, palpitations, shortness of breath.   Denies excessive thirst, urination or heat or cold intolerance.   Denies nausea, vomiting, melena or hematochezia.    Denies fever, chills, night sweats, weight loss.    Denies dysuria or hematuria.   Denies history of anxiety or depression.   Denies any skin abnormalities or rash.   Denies upper or lower extremity paresthesias or lightheadedness.    Denies cough, shortness of breath or hemoptysis.     Physical Exam:   Vitals:    04/23/20 1017   BP: 124/88   Pulse: 78   Weight: 120.5 kg (265 lb 8.7 oz)     Awake/alert/oriented x3, No acute distress, Afebrile, Vital signs stable  Normocephalic, Atraumatic  Heart is beating at normal rate  Good inspiratory effort with unlaboured  breathing  Abdomen soft/nondistended/nontender    Right upper extremity  Motor intact C5-T1  Sensation intact C5-T2  2+ brachial/radial/ulnar pulses  <2s CR refill to digits  Right shoulder range of motion:  160° forward flexion, 160° abduction, 45° external rotation at 0°    4/5 supraspinatus  4/5 infraspinatus  5/5 subscapularis    Assessment:   59 y.o. male 3 months status post right shoulder arthroscopy with RCR    Plan:   Activity as tolerated  Physical therapy referral for outpatient therapy.  Suggestion is that should be starting up soon.  Home exercise program, and exercises demonstrated the patient  Pain prescription for the patient today  Return to clinic in 2 months    Aureliano Cárdenas MD  [facility]

## 2020-04-27 ENCOUNTER — HOSPITAL ENCOUNTER (INPATIENT)
Facility: HOSPITAL | Age: 60
LOS: 3 days | Discharge: HOME | DRG: 176 | End: 2020-04-30
Attending: INTERNAL MEDICINE | Admitting: INTERNAL MEDICINE
Payer: MEDICARE

## 2020-04-27 DIAGNOSIS — I77.89 OTHER SPECIFIED DISORDERS OF ARTERIES AND ARTERIOLES: ICD-10-CM

## 2020-04-27 DIAGNOSIS — N18.30 CKD (CHRONIC KIDNEY DISEASE), STAGE III: ICD-10-CM

## 2020-04-27 DIAGNOSIS — R00.0 TACHYCARDIA: ICD-10-CM

## 2020-04-27 DIAGNOSIS — I24.0 RIGHT CORONARY ARTERY OCCLUSION: ICD-10-CM

## 2020-04-27 DIAGNOSIS — I26.99 BILATERAL PULMONARY EMBOLISM: Primary | ICD-10-CM

## 2020-04-27 DIAGNOSIS — G89.29 CHRONIC BILATERAL LOW BACK PAIN WITHOUT SCIATICA: ICD-10-CM

## 2020-04-27 DIAGNOSIS — I26.99 PULMONARY EMBOLISM: ICD-10-CM

## 2020-04-27 DIAGNOSIS — R52 PAIN: ICD-10-CM

## 2020-04-27 DIAGNOSIS — E78.2 MIXED HYPERLIPIDEMIA: ICD-10-CM

## 2020-04-27 DIAGNOSIS — M54.50 CHRONIC BILATERAL LOW BACK PAIN WITHOUT SCIATICA: ICD-10-CM

## 2020-04-27 DIAGNOSIS — I26.99 PE (PULMONARY THROMBOEMBOLISM): ICD-10-CM

## 2020-04-27 LAB
APTT BLDCRRT: 29.4 SEC (ref 21–32)
BASOPHILS # BLD AUTO: 0.04 K/UL (ref 0–0.2)
BASOPHILS NFR BLD: 0.6 % (ref 0–1.9)
DIFFERENTIAL METHOD: ABNORMAL
EOSINOPHIL # BLD AUTO: 0.2 K/UL (ref 0–0.5)
EOSINOPHIL NFR BLD: 3.1 % (ref 0–8)
ERYTHROCYTE [DISTWIDTH] IN BLOOD BY AUTOMATED COUNT: 15.2 % (ref 11.5–14.5)
HCT VFR BLD AUTO: 46.5 % (ref 40–54)
HGB BLD-MCNC: 15.5 G/DL (ref 14–18)
IMM GRANULOCYTES # BLD AUTO: 0.02 K/UL (ref 0–0.04)
IMM GRANULOCYTES NFR BLD AUTO: 0.3 % (ref 0–0.5)
INR PPP: 1.1 (ref 0.8–1.2)
LYMPHOCYTES # BLD AUTO: 1.7 K/UL (ref 1–4.8)
LYMPHOCYTES NFR BLD: 24.3 % (ref 18–48)
MCH RBC QN AUTO: 28.8 PG (ref 27–31)
MCHC RBC AUTO-ENTMCNC: 33.3 G/DL (ref 32–36)
MCV RBC AUTO: 86 FL (ref 82–98)
MONOCYTES # BLD AUTO: 0.7 K/UL (ref 0.3–1)
MONOCYTES NFR BLD: 10.1 % (ref 4–15)
NEUTROPHILS # BLD AUTO: 4.2 K/UL (ref 1.8–7.7)
NEUTROPHILS NFR BLD: 61.6 % (ref 38–73)
NRBC BLD-RTO: 0 /100 WBC
PLATELET # BLD AUTO: 198 K/UL (ref 150–350)
PMV BLD AUTO: 11.6 FL (ref 9.2–12.9)
POCT GLUCOSE: 79 MG/DL (ref 70–110)
PROTHROMBIN TIME: 11.2 SEC (ref 9–12.5)
RBC # BLD AUTO: 5.39 M/UL (ref 4.6–6.2)
WBC # BLD AUTO: 6.82 K/UL (ref 3.9–12.7)

## 2020-04-27 PROCEDURE — 37211 PR ATERIAL THROMBOLYTIC INFUSION, INITIAL DAY, S&I: ICD-10-PCS | Mod: 50,,, | Performed by: INTERNAL MEDICINE

## 2020-04-27 PROCEDURE — 63600175 PHARM REV CODE 636 W HCPCS: Mod: JG | Performed by: INTERNAL MEDICINE

## 2020-04-27 PROCEDURE — 36013 PR PLACE CATH IN RT HRT,MAIN PULM ART: ICD-10-PCS | Mod: ,,, | Performed by: INTERNAL MEDICINE

## 2020-04-27 PROCEDURE — 99153 MOD SED SAME PHYS/QHP EA: CPT | Performed by: INTERNAL MEDICINE

## 2020-04-27 PROCEDURE — 99152 MOD SED SAME PHYS/QHP 5/>YRS: CPT | Performed by: INTERNAL MEDICINE

## 2020-04-27 PROCEDURE — 85730 THROMBOPLASTIN TIME PARTIAL: CPT

## 2020-04-27 PROCEDURE — 99152 MOD SED SAME PHYS/QHP 5/>YRS: CPT | Mod: ,,, | Performed by: INTERNAL MEDICINE

## 2020-04-27 PROCEDURE — 25000003 PHARM REV CODE 250: Performed by: STUDENT IN AN ORGANIZED HEALTH CARE EDUCATION/TRAINING PROGRAM

## 2020-04-27 PROCEDURE — 25000003 PHARM REV CODE 250: Performed by: INTERNAL MEDICINE

## 2020-04-27 PROCEDURE — 75741 ARTERY X-RAYS LUNG: CPT | Mod: 26,59,, | Performed by: INTERNAL MEDICINE

## 2020-04-27 PROCEDURE — 99223 1ST HOSP IP/OBS HIGH 75: CPT | Mod: AI,,, | Performed by: INTERNAL MEDICINE

## 2020-04-27 PROCEDURE — 36013 PLACE CATHETER IN ARTERY: CPT | Mod: ,,, | Performed by: INTERNAL MEDICINE

## 2020-04-27 PROCEDURE — 85025 COMPLETE CBC W/AUTO DIFF WBC: CPT | Mod: 91

## 2020-04-27 PROCEDURE — 20000000 HC ICU ROOM

## 2020-04-27 PROCEDURE — 99223 PR INITIAL HOSPITAL CARE,LEVL III: ICD-10-PCS | Mod: AI,,, | Performed by: INTERNAL MEDICINE

## 2020-04-27 PROCEDURE — C1894 INTRO/SHEATH, NON-LASER: HCPCS | Performed by: INTERNAL MEDICINE

## 2020-04-27 PROCEDURE — 75741 ARTERY X-RAYS LUNG: CPT | Mod: 59 | Performed by: INTERNAL MEDICINE

## 2020-04-27 PROCEDURE — 75741 PR  ANGIO PULMON UNILAT SELECT: ICD-10-PCS | Mod: 26,59,, | Performed by: INTERNAL MEDICINE

## 2020-04-27 PROCEDURE — 37211 THROMBOLYTIC ART THERAPY: CPT | Mod: 50,,, | Performed by: INTERNAL MEDICINE

## 2020-04-27 PROCEDURE — 85610 PROTHROMBIN TIME: CPT | Mod: 91

## 2020-04-27 PROCEDURE — 36013 PLACE CATHETER IN ARTERY: CPT | Performed by: INTERNAL MEDICINE

## 2020-04-27 PROCEDURE — 37211 THROMBOLYTIC ART THERAPY: CPT | Mod: 50 | Performed by: INTERNAL MEDICINE

## 2020-04-27 PROCEDURE — C1751 CATH, INF, PER/CENT/MIDLINE: HCPCS | Performed by: INTERNAL MEDICINE

## 2020-04-27 PROCEDURE — 75746 ARTERY X-RAYS LUNG: CPT | Mod: 59 | Performed by: INTERNAL MEDICINE

## 2020-04-27 PROCEDURE — 25500020 PHARM REV CODE 255: Performed by: INTERNAL MEDICINE

## 2020-04-27 PROCEDURE — 99152 PR MOD CONSCIOUS SEDATION, SAME PHYS, 5+ YRS, FIRST 15 MIN: ICD-10-PCS | Mod: ,,, | Performed by: INTERNAL MEDICINE

## 2020-04-27 PROCEDURE — 63600175 PHARM REV CODE 636 W HCPCS: Performed by: STUDENT IN AN ORGANIZED HEALTH CARE EDUCATION/TRAINING PROGRAM

## 2020-04-27 PROCEDURE — C1769 GUIDE WIRE: HCPCS | Performed by: INTERNAL MEDICINE

## 2020-04-27 PROCEDURE — 63600175 PHARM REV CODE 636 W HCPCS: Performed by: INTERNAL MEDICINE

## 2020-04-27 RX ORDER — HEPARIN SODIUM,PORCINE/D5W 25000/250
16 INTRAVENOUS SOLUTION INTRAVENOUS CONTINUOUS
Status: DISCONTINUED | OUTPATIENT
Start: 2020-04-27 | End: 2020-04-28

## 2020-04-27 RX ORDER — GLUCAGON 1 MG
1 KIT INJECTION
Status: DISCONTINUED | OUTPATIENT
Start: 2020-04-27 | End: 2020-04-30

## 2020-04-27 RX ORDER — SODIUM CHLORIDE 9 MG/ML
INJECTION, SOLUTION INTRAVENOUS CONTINUOUS
Status: DISCONTINUED | OUTPATIENT
Start: 2020-04-28 | End: 2020-04-28

## 2020-04-27 RX ORDER — PANTOPRAZOLE SODIUM 40 MG/1
40 TABLET, DELAYED RELEASE ORAL DAILY
Status: DISCONTINUED | OUTPATIENT
Start: 2020-04-28 | End: 2020-04-30 | Stop reason: HOSPADM

## 2020-04-27 RX ORDER — DIPHENHYDRAMINE HCL 25 MG
50 CAPSULE ORAL ONCE
Status: CANCELLED | OUTPATIENT
Start: 2020-04-27 | End: 2020-04-27

## 2020-04-27 RX ORDER — MIDAZOLAM HYDROCHLORIDE 1 MG/ML
INJECTION, SOLUTION INTRAMUSCULAR; INTRAVENOUS
Status: DISCONTINUED | OUTPATIENT
Start: 2020-04-27 | End: 2020-04-27 | Stop reason: HOSPADM

## 2020-04-27 RX ORDER — FENTANYL CITRATE 50 UG/ML
INJECTION, SOLUTION INTRAMUSCULAR; INTRAVENOUS
Status: DISCONTINUED | OUTPATIENT
Start: 2020-04-27 | End: 2020-04-27 | Stop reason: HOSPADM

## 2020-04-27 RX ORDER — HYDROCODONE BITARTRATE AND ACETAMINOPHEN 5; 325 MG/1; MG/1
1 TABLET ORAL EVERY 6 HOURS PRN
Status: DISCONTINUED | OUTPATIENT
Start: 2020-04-28 | End: 2020-04-28

## 2020-04-27 RX ORDER — PRAVASTATIN SODIUM 20 MG/1
20 TABLET ORAL DAILY
Status: DISCONTINUED | OUTPATIENT
Start: 2020-04-28 | End: 2020-04-30

## 2020-04-27 RX ORDER — SODIUM CHLORIDE 0.9 % (FLUSH) 0.9 %
10 SYRINGE (ML) INJECTION
Status: DISCONTINUED | OUTPATIENT
Start: 2020-04-27 | End: 2020-04-30 | Stop reason: HOSPADM

## 2020-04-27 RX ADMIN — HYDROCODONE BITARTRATE AND ACETAMINOPHEN 1 TABLET: 5; 325 TABLET ORAL at 11:04

## 2020-04-27 RX ADMIN — ALTEPLASE 1 MG/HR: 2.2 INJECTION, POWDER, LYOPHILIZED, FOR SOLUTION INTRAVENOUS at 11:04

## 2020-04-27 RX ADMIN — DEXTROSE 125 ML: 10 SOLUTION INTRAVENOUS at 11:04

## 2020-04-27 RX ADMIN — HEPARIN SODIUM 18 UNITS/KG/HR: 10000 INJECTION, SOLUTION INTRAVENOUS at 11:04

## 2020-04-27 NOTE — Clinical Note
Catheter is inserted into the. INSERTED INTO THE MPA AND HEMODYNAMICS RECORDED. ANGIOGRAPHY PERFORMED.

## 2020-04-28 LAB
ALBUMIN SERPL BCP-MCNC: 3.6 G/DL (ref 3.5–5.2)
ALP SERPL-CCNC: 85 U/L (ref 55–135)
ALT SERPL W/O P-5'-P-CCNC: 18 U/L (ref 10–44)
ANION GAP SERPL CALC-SCNC: 10 MMOL/L (ref 8–16)
APTT BLDCRRT: 51.8 SEC (ref 21–32)
APTT BLDCRRT: 54.3 SEC (ref 21–32)
APTT BLDCRRT: 65.6 SEC (ref 21–32)
APTT BLDCRRT: 75.5 SEC (ref 21–32)
ASCENDING AORTA: 3.21 CM
AST SERPL-CCNC: 26 U/L (ref 10–40)
AV INDEX (PROSTH): 0.76
AV MEAN GRADIENT: 1 MMHG
AV PEAK GRADIENT: 2 MMHG
AV VALVE AREA: 3.25 CM2
AV VELOCITY RATIO: 0.68
BASOPHILS # BLD AUTO: 0.03 K/UL (ref 0–0.2)
BASOPHILS # BLD AUTO: 0.03 K/UL (ref 0–0.2)
BASOPHILS NFR BLD: 0.4 % (ref 0–1.9)
BASOPHILS NFR BLD: 0.5 % (ref 0–1.9)
BILIRUB SERPL-MCNC: 0.9 MG/DL (ref 0.1–1)
BSA FOR ECHO PROCEDURE: 2.48 M2
BUN SERPL-MCNC: 9 MG/DL (ref 6–20)
CALCIUM SERPL-MCNC: 9.3 MG/DL (ref 8.7–10.5)
CHLORIDE SERPL-SCNC: 101 MMOL/L (ref 95–110)
CO2 SERPL-SCNC: 25 MMOL/L (ref 23–29)
CREAT SERPL-MCNC: 0.9 MG/DL (ref 0.5–1.4)
CV ECHO LV RWT: 0.35 CM
DIFFERENTIAL METHOD: ABNORMAL
DIFFERENTIAL METHOD: ABNORMAL
DOP CALC AO PEAK VEL: 0.75 M/S
DOP CALC AO VTI: 10.87 CM
DOP CALC LVOT AREA: 4.3 CM2
DOP CALC LVOT DIAMETER: 2.33 CM
DOP CALC LVOT PEAK VEL: 0.51 M/S
DOP CALC LVOT STROKE VOLUME: 35.33 CM3
DOP CALCLVOT PEAK VEL VTI: 8.29 CM
E WAVE DECELERATION TIME: 163.5 MSEC
E/A RATIO: 0.75
E/E' RATIO: 10 M/S
ECHO LV POSTERIOR WALL: 0.87 CM (ref 0.6–1.1)
EOSINOPHIL # BLD AUTO: 0.2 K/UL (ref 0–0.5)
EOSINOPHIL # BLD AUTO: 0.3 K/UL (ref 0–0.5)
EOSINOPHIL NFR BLD: 3.6 % (ref 0–8)
EOSINOPHIL NFR BLD: 4 % (ref 0–8)
ERYTHROCYTE [DISTWIDTH] IN BLOOD BY AUTOMATED COUNT: 14.9 % (ref 11.5–14.5)
ERYTHROCYTE [DISTWIDTH] IN BLOOD BY AUTOMATED COUNT: 15.4 % (ref 11.5–14.5)
EST. GFR  (AFRICAN AMERICAN): >60 ML/MIN/1.73 M^2
EST. GFR  (NON AFRICAN AMERICAN): >60 ML/MIN/1.73 M^2
FIBRINOGEN PPP-MCNC: 352 MG/DL (ref 182–366)
FIBRINOGEN PPP-MCNC: 364 MG/DL (ref 182–366)
FIBRINOGEN PPP-MCNC: 467 MG/DL (ref 182–366)
FIBRINOGEN PPP-MCNC: 488 MG/DL (ref 182–366)
FRACTIONAL SHORTENING: 25 % (ref 28–44)
GLUCOSE SERPL-MCNC: 102 MG/DL (ref 70–110)
HCT VFR BLD AUTO: 45.5 % (ref 40–54)
HCT VFR BLD AUTO: 46 % (ref 40–54)
HGB BLD-MCNC: 14.9 G/DL (ref 14–18)
HGB BLD-MCNC: 14.9 G/DL (ref 14–18)
IMM GRANULOCYTES # BLD AUTO: 0.02 K/UL (ref 0–0.04)
IMM GRANULOCYTES # BLD AUTO: 0.03 K/UL (ref 0–0.04)
IMM GRANULOCYTES NFR BLD AUTO: 0.3 % (ref 0–0.5)
IMM GRANULOCYTES NFR BLD AUTO: 0.4 % (ref 0–0.5)
INR PPP: 1.1 (ref 0.8–1.2)
INTERVENTRICULAR SEPTUM: 0.75 CM (ref 0.6–1.1)
LA MAJOR: 4.57 CM
LA MINOR: 4.28 CM
LA WIDTH: 3.6 CM
LEFT ATRIUM SIZE: 3.44 CM
LEFT ATRIUM VOLUME INDEX: 19.3 ML/M2
LEFT ATRIUM VOLUME: 46.53 CM3
LEFT INTERNAL DIMENSION IN SYSTOLE: 3.79 CM (ref 2.1–4)
LEFT VENTRICLE DIASTOLIC VOLUME INDEX: 49.96 ML/M2
LEFT VENTRICLE DIASTOLIC VOLUME: 120.15 ML
LEFT VENTRICLE MASS INDEX: 58 G/M2
LEFT VENTRICLE SYSTOLIC VOLUME INDEX: 25.5 ML/M2
LEFT VENTRICLE SYSTOLIC VOLUME: 61.4 ML
LEFT VENTRICULAR INTERNAL DIMENSION IN DIASTOLE: 5.03 CM (ref 3.5–6)
LEFT VENTRICULAR MASS: 139.39 G
LV LATERAL E/E' RATIO: 8 M/S
LV SEPTAL E/E' RATIO: 13.33 M/S
LYMPHOCYTES # BLD AUTO: 1.4 K/UL (ref 1–4.8)
LYMPHOCYTES # BLD AUTO: 1.5 K/UL (ref 1–4.8)
LYMPHOCYTES NFR BLD: 21.5 % (ref 18–48)
LYMPHOCYTES NFR BLD: 24.6 % (ref 18–48)
MAGNESIUM SERPL-MCNC: 1.8 MG/DL (ref 1.6–2.6)
MCH RBC QN AUTO: 28 PG (ref 27–31)
MCH RBC QN AUTO: 28.1 PG (ref 27–31)
MCHC RBC AUTO-ENTMCNC: 32.4 G/DL (ref 32–36)
MCHC RBC AUTO-ENTMCNC: 32.7 G/DL (ref 32–36)
MCV RBC AUTO: 85 FL (ref 82–98)
MCV RBC AUTO: 87 FL (ref 82–98)
MONOCYTES # BLD AUTO: 0.5 K/UL (ref 0.3–1)
MONOCYTES # BLD AUTO: 0.8 K/UL (ref 0.3–1)
MONOCYTES NFR BLD: 11.2 % (ref 4–15)
MONOCYTES NFR BLD: 7.4 % (ref 4–15)
MV PEAK A VEL: 0.53 M/S
MV PEAK E VEL: 0.4 M/S
NEUTROPHILS # BLD AUTO: 3.9 K/UL (ref 1.8–7.7)
NEUTROPHILS # BLD AUTO: 4.2 K/UL (ref 1.8–7.7)
NEUTROPHILS NFR BLD: 62.5 % (ref 38–73)
NEUTROPHILS NFR BLD: 63.6 % (ref 38–73)
NRBC BLD-RTO: 0 /100 WBC
NRBC BLD-RTO: 0 /100 WBC
PHOSPHATE SERPL-MCNC: 2.9 MG/DL (ref 2.7–4.5)
PISA TR MAX VEL: 1.95 M/S
PLATELET # BLD AUTO: 195 K/UL (ref 150–350)
PLATELET # BLD AUTO: 204 K/UL (ref 150–350)
PMV BLD AUTO: 11.2 FL (ref 9.2–12.9)
PMV BLD AUTO: 12 FL (ref 9.2–12.9)
POCT GLUCOSE: 100 MG/DL (ref 70–110)
POCT GLUCOSE: 100 MG/DL (ref 70–110)
POCT GLUCOSE: 104 MG/DL (ref 70–110)
POCT GLUCOSE: 112 MG/DL (ref 70–110)
POCT GLUCOSE: 81 MG/DL (ref 70–110)
POTASSIUM SERPL-SCNC: 4.1 MMOL/L (ref 3.5–5.1)
PROT SERPL-MCNC: 7.3 G/DL (ref 6–8.4)
PROTHROMBIN TIME: 10.9 SEC (ref 9–12.5)
RA MAJOR: 3.99 CM
RA WIDTH: 2.69 CM
RBC # BLD AUTO: 5.3 M/UL (ref 4.6–6.2)
RBC # BLD AUTO: 5.33 M/UL (ref 4.6–6.2)
RIGHT VENTRICULAR END-DIASTOLIC DIMENSION: 3.95 CM
RV TISSUE DOPPLER FREE WALL SYSTOLIC VELOCITY 1 (APICAL 4 CHAMBER VIEW): 13.23 CM/S
SINUS: 3.56 CM
SODIUM SERPL-SCNC: 136 MMOL/L (ref 136–145)
STJ: 3.21 CM
TDI LATERAL: 0.05 M/S
TDI SEPTAL: 0.03 M/S
TDI: 0.04 M/S
TR MAX PG: 15 MMHG
TRICUSPID ANNULAR PLANE SYSTOLIC EXCURSION: 2.19 CM
WBC # BLD AUTO: 6.09 K/UL (ref 3.9–12.7)
WBC # BLD AUTO: 6.71 K/UL (ref 3.9–12.7)

## 2020-04-28 PROCEDURE — 85730 THROMBOPLASTIN TIME PARTIAL: CPT

## 2020-04-28 PROCEDURE — 63600175 PHARM REV CODE 636 W HCPCS: Performed by: STUDENT IN AN ORGANIZED HEALTH CARE EDUCATION/TRAINING PROGRAM

## 2020-04-28 PROCEDURE — 63600175 PHARM REV CODE 636 W HCPCS: Mod: JG | Performed by: INTERNAL MEDICINE

## 2020-04-28 PROCEDURE — 85384 FIBRINOGEN ACTIVITY: CPT | Mod: 91

## 2020-04-28 PROCEDURE — 84100 ASSAY OF PHOSPHORUS: CPT

## 2020-04-28 PROCEDURE — 63600175 PHARM REV CODE 636 W HCPCS: Performed by: INTERNAL MEDICINE

## 2020-04-28 PROCEDURE — 99232 PR SUBSEQUENT HOSPITAL CARE,LEVL II: ICD-10-PCS | Mod: ,,, | Performed by: INTERNAL MEDICINE

## 2020-04-28 PROCEDURE — 80053 COMPREHEN METABOLIC PANEL: CPT

## 2020-04-28 PROCEDURE — 20000000 HC ICU ROOM

## 2020-04-28 PROCEDURE — 85730 THROMBOPLASTIN TIME PARTIAL: CPT | Mod: 91

## 2020-04-28 PROCEDURE — 93005 ELECTROCARDIOGRAM TRACING: CPT

## 2020-04-28 PROCEDURE — 85610 PROTHROMBIN TIME: CPT

## 2020-04-28 PROCEDURE — 25000003 PHARM REV CODE 250: Performed by: STUDENT IN AN ORGANIZED HEALTH CARE EDUCATION/TRAINING PROGRAM

## 2020-04-28 PROCEDURE — 25000003 PHARM REV CODE 250: Performed by: INTERNAL MEDICINE

## 2020-04-28 PROCEDURE — 83735 ASSAY OF MAGNESIUM: CPT

## 2020-04-28 PROCEDURE — 99232 SBSQ HOSP IP/OBS MODERATE 35: CPT | Mod: ,,, | Performed by: INTERNAL MEDICINE

## 2020-04-28 PROCEDURE — 93010 ELECTROCARDIOGRAM REPORT: CPT | Mod: ,,, | Performed by: INTERNAL MEDICINE

## 2020-04-28 PROCEDURE — 94761 N-INVAS EAR/PLS OXIMETRY MLT: CPT

## 2020-04-28 PROCEDURE — 93010 EKG 12-LEAD: ICD-10-PCS | Mod: ,,, | Performed by: INTERNAL MEDICINE

## 2020-04-28 PROCEDURE — 85025 COMPLETE CBC W/AUTO DIFF WBC: CPT

## 2020-04-28 RX ORDER — ACETAMINOPHEN 325 MG/1
650 TABLET ORAL ONCE AS NEEDED
Status: COMPLETED | OUTPATIENT
Start: 2020-04-29 | End: 2020-04-28

## 2020-04-28 RX ORDER — ENOXAPARIN SODIUM 150 MG/ML
120 INJECTION SUBCUTANEOUS ONCE
Status: COMPLETED | OUTPATIENT
Start: 2020-04-28 | End: 2020-04-28

## 2020-04-28 RX ORDER — ACETAMINOPHEN 500 MG
1000 TABLET ORAL ONCE AS NEEDED
Status: DISCONTINUED | OUTPATIENT
Start: 2020-04-29 | End: 2020-04-28

## 2020-04-28 RX ORDER — ACETAMINOPHEN 325 MG/1
TABLET ORAL
Status: DISPENSED
Start: 2020-04-28 | End: 2020-04-29

## 2020-04-28 RX ORDER — LISINOPRIL AND HYDROCHLOROTHIAZIDE 10; 12.5 MG/1; MG/1
2 TABLET ORAL DAILY
Status: DISCONTINUED | OUTPATIENT
Start: 2020-04-28 | End: 2020-04-28

## 2020-04-28 RX ORDER — LISINOPRIL AND HYDROCHLOROTHIAZIDE 10; 12.5 MG/1; MG/1
1 TABLET ORAL DAILY
Status: DISCONTINUED | OUTPATIENT
Start: 2020-04-29 | End: 2020-04-30 | Stop reason: HOSPADM

## 2020-04-28 RX ORDER — HYDROCODONE BITARTRATE AND ACETAMINOPHEN 5; 325 MG/1; MG/1
1 TABLET ORAL EVERY 6 HOURS PRN
Status: DISCONTINUED | OUTPATIENT
Start: 2020-04-28 | End: 2020-04-30 | Stop reason: HOSPADM

## 2020-04-28 RX ORDER — HEPARIN SODIUM,PORCINE/D5W 25000/250
16 INTRAVENOUS SOLUTION INTRAVENOUS CONTINUOUS
Status: DISCONTINUED | OUTPATIENT
Start: 2020-04-28 | End: 2020-04-28

## 2020-04-28 RX ORDER — HYDROCODONE BITARTRATE AND ACETAMINOPHEN 5; 325 MG/1; MG/1
1 TABLET ORAL EVERY 8 HOURS PRN
Status: DISCONTINUED | OUTPATIENT
Start: 2020-04-28 | End: 2020-04-28

## 2020-04-28 RX ORDER — AMOXICILLIN 250 MG
1 CAPSULE ORAL DAILY
Status: DISCONTINUED | OUTPATIENT
Start: 2020-04-28 | End: 2020-04-30 | Stop reason: HOSPADM

## 2020-04-28 RX ADMIN — SODIUM CHLORIDE: 0.9 INJECTION, SOLUTION INTRAVENOUS at 12:04

## 2020-04-28 RX ADMIN — HEPARIN SODIUM 18 UNITS/KG/HR: 10000 INJECTION, SOLUTION INTRAVENOUS at 10:04

## 2020-04-28 RX ADMIN — PRAVASTATIN SODIUM 20 MG: 20 TABLET ORAL at 10:04

## 2020-04-28 RX ADMIN — ALTEPLASE 1 MG/HR: 2.2 INJECTION, POWDER, LYOPHILIZED, FOR SOLUTION INTRAVENOUS at 07:04

## 2020-04-28 RX ADMIN — ACETAMINOPHEN 650 MG: 325 TABLET ORAL at 11:04

## 2020-04-28 RX ADMIN — HEPARIN SODIUM AND DEXTROSE 16 UNITS/KG/HR: 10000; 5 INJECTION INTRAVENOUS at 06:04

## 2020-04-28 RX ADMIN — HUMAN ALBUMIN MICROSPHERES AND PERFLUTREN 0.11 MG: 10; .22 INJECTION, SOLUTION INTRAVENOUS at 11:04

## 2020-04-28 RX ADMIN — HYDROCODONE BITARTRATE AND ACETAMINOPHEN 1 TABLET: 5; 325 TABLET ORAL at 09:04

## 2020-04-28 RX ADMIN — LISINOPRIL AND HYDROCHLOROTHIAZIDE 2 TABLET: 12.5; 1 TABLET ORAL at 12:04

## 2020-04-28 RX ADMIN — ALTEPLASE 1 MG/HR: 2.2 INJECTION, POWDER, LYOPHILIZED, FOR SOLUTION INTRAVENOUS at 10:04

## 2020-04-28 RX ADMIN — HYDROCODONE BITARTRATE AND ACETAMINOPHEN 1 TABLET: 5; 325 TABLET ORAL at 06:04

## 2020-04-28 RX ADMIN — ENOXAPARIN SODIUM 120 MG: 120 INJECTION SUBCUTANEOUS at 09:04

## 2020-04-28 RX ADMIN — HYDROCODONE BITARTRATE AND ACETAMINOPHEN 1 TABLET: 5; 325 TABLET ORAL at 02:04

## 2020-04-28 RX ADMIN — PANTOPRAZOLE SODIUM 40 MG: 40 TABLET, DELAYED RELEASE ORAL at 10:04

## 2020-04-28 NOTE — SIGNIFICANT EVENT
TPA infusion completed at 2PM today. PASP now 30 mmHg with a mean of 13 mmHg. Pigtail infusion catheter and RIJ sheath removed at bedside, dressing applied. Plan for transition from heparin gtt to PO anticoagulation and likely discharge tomorrow per primary service.    Signed:  Conor Montana MD  Interventional Cardiology Fellow - PGY7  Pager: [phone]  4/28/2020 4:08 PM

## 2020-04-28 NOTE — HPI
Mr. Willy Delgado, 59 y.o. male with prior history of Hypertension, Hyperlipidemia, and prior PE in 2002 was provoked by knee surgery and finished coumadin 6 months and stopped. He was at his baseline health until his surgery of his Right shoulder surgery for Rotator cuff impingement syndrome on 1/24 he has been taking oxycodon for pain. He became less active than before. Last Friday, 4/25 He had travelled to [place], [place] last week by car 8 hour followed by return to [place], [place] in the next day. Today he noted a worsening episode of shortness of breath when he ambulate himself to his car and it was associated with left sided chest pain described as pleuritic worsen with deep breath. He denies prior chest pain like this before, he denies palpitation, loss of consciousness or change in his medications. On arrival he underwent CTA and showed extensive PE. Admitted to cardiology for submassive PE.

## 2020-04-28 NOTE — HOSPITAL COURSE
Upon arrival, PERT team was activated. Patient was taken to cath lab for insertion of catheter for catheter directed tPA. Tolerated for 15hrs along with heparin. Monitored overnight and had persistent headache concerning for possible stroke. Work-up initiated and incidental finding of right side ICA stenosis of 60-70%, left side roughly 30% per MRA. No stroke found and denies acute neurological changes. Plan to follow up with cardiology 4-6 weeks along with carotid duplex U/S as he may need vascular intervention. Plan for life long therapy with eliquis as this is a second time with PE/ DVT. Family called. Questions sought and answered. Patient was discharged in stable condition for follow up with cardiology and his PCP

## 2020-04-28 NOTE — PLAN OF CARE
CM obtained discharge planning information from call to family member and past medical records.  No discharge needs at this time.      Addy Workman MD  [address] / [place] [address]      [id number] - [place], [place] - [address] AT [facility] [address]  [address] [place] [address]  Phone: [phone] Fax: [phone]    Replaced by [facility] - [place] - [address]  [address] [place] [address]  Phone: [phone] Fax: [phone]      Payor: [employer] / Plan: [employer] 65 / Product Type: Medicare Advantage /     Extended Emergency Contact Information  Primary Emergency Contact: Rayna Rene  Mobile Phone: [phone]  Relation: Daughter  Preferred language: English   needed? No    Future Appointments   Date Time Provider Department Center   4/28/2020 10:15 AM [facility]   4/28/2020 10:45 AM [facility] US PORTABLE NOM ULSOUND [facility]   6/18/2020 11:00 AM [facility]urel[facility] C[facility]denas MD [facility]            04/28/20 0932   Discharge Assessment   Assessment Type Discharge Planning Assessment   Confirmed/corrected address and phone number on facesheet? Yes   Assessment information obtained from? Caregiver;Medical Record   Communicated expected length of stay with patient/caregiver yes   Prior to hospitilization cognitive status: Alert/Oriented   Prior to hospitalization functional status: Independent   Current cognitive status: Alert/Oriented   Current Functional Status:   (unable to assess)   Lives With alone   Able to Return to Prior Arrangements yes   Is patient able to care for self after discharge? Yes   Who are your caregiver(s) and their phone number(s)? Rayna Rene (sister)- [phone]   Patient currently being followed by outpatient case management? No   Patient currently receives any other outside agency  services? No   Equipment Currently Used at Home none   Do you have any problems affording any of your prescribed medications? No   Is the patient taking medications as prescribed? yes   Does the patient have transportation home? Yes   Transportation Anticipated family or friend will provide   Dialysis Name and Scheduled days N/A   Does the patient receive services at the Coumadin Clinic? No   Discharge Plan A Home with family   Discharge Plan B Home Health   DME Needed Upon Discharge  other (see comments)  (TBD)   Patient/Family in Agreement with Plan yes       Kristina Buckley MPH, RN, CM  Ext. [phone]

## 2020-04-28 NOTE — ASSESSMENT & PLAN NOTE
- Baseline Creatinine is 1.3, and current stable sCr     PLAN:  - Will continue trending urine output and renal function, avoid NSAIDs, contrast, nephrotoxins

## 2020-04-28 NOTE — SUBJECTIVE & OBJECTIVE
Interval History: Patient underwent catheter direct thrombolysis. tPA discontinued after 15 hours. Heparin to be continued till 8:45 PM with change over to theraputic Lovenox at 9:00 PM and begin eliquis in the morning    Review of Systems   Constitution: Negative for chills, diaphoresis and fever.   Eyes: Negative for blurred vision and double vision.   Cardiovascular: Negative for chest pain, cyanosis and palpitations.   Respiratory: Negative for cough, hemoptysis and wheezing.    Musculoskeletal: Positive for joint pain (recent shoulder repair). Negative for back pain.   Gastrointestinal: Negative for bloating and abdominal pain.   Genitourinary: Negative for dysuria, flank pain and frequency.   Neurological: Negative for headaches, light-headedness and weakness.   Psychiatric/Behavioral: Negative for altered mental status. The patient is not nervous/anxious.      Objective:     Vital Signs (Most Recent):  Temp: 98.1 °F (36.7 °C) (04/28/20 1515)  Pulse: 89 (04/28/20 1700)  Resp: 17 (04/28/20 1700)  BP: (!) 138/94 (04/28/20 1630)  SpO2: 97 % (04/28/20 1700) Vital Signs (24h Range):  Temp:  [97.5 °F (36.4 °C)-98.1 °F (36.7 °C)] 98.1 °F (36.7 °C)  Pulse:  [72-96] 89  Resp:  [8-39] 17  SpO2:  [93 %-98 %] 97 %  BP: (114-163)/() 138/94     Weight: 120.7 kg (266 lb)  Body mass index is 36.08 kg/m².     SpO2: 97 %  O2 Device (Oxygen Therapy): room air      Intake/Output Summary (Last 24 hours) at 4/28/2020 1753  Last data filed at 4/28/2020 1300  Gross per 24 hour   Intake 534 ml   Output 1350 ml   Net -816 ml       Lines/Drains/Airways     Drain                 Sheath 04/27/20 2300 Right less than 1 day          Peripheral Intravenous Line                 Peripheral IV - Single Lumen 04/27/20 1127 18 G Right Forearm 1 day         Peripheral IV - Single Lumen 04/27/20 1745 18 G Left Forearm 1 day                Physical Exam   Constitutional: He is oriented to person, place, and time. He appears well-developed and  well-nourished. No distress.   HENT:   Head: Normocephalic and atraumatic.   Eyes: Pupils are equal, round, and reactive to light. EOM are normal.   Neck: Neck supple.   IJ removed   Cardiovascular: Normal rate and regular rhythm.   Pulmonary/Chest: Effort normal and breath sounds normal. No respiratory distress. He exhibits no tenderness.   Abdominal: Soft. Bowel sounds are normal. He exhibits no distension. There is no tenderness.   Musculoskeletal: Normal range of motion. He exhibits no edema.   Neurological: He is alert and oriented to person, place, and time.   Skin: Skin is warm and dry. He is not diaphoretic. No erythema.   Psychiatric: He has a normal mood and affect. His behavior is normal. Judgment and thought content normal.       Significant Labs:   Blood Culture: No results for input(s): LABBLOO in the last 48 hours., CMP   Recent Labs   Lab 04/27/20 1127 04/28/20  0400    136   K 3.5 4.1   CL 99* 101   CO2 26 25    102   BUN 13 9   CREATININE 1.3 0.9   CALCIUM 9.9 9.3   PROT 8.3 7.3   ALBUMIN 4.5 3.6   BILITOT 1.0 0.9   ALKPHOS 83 85   AST 26 26   ALT 21 18   ANIONGAP 14 10   ESTGFRAFRICA >60.0 >60.0   EGFRNONAA 59.3* >60.0   , CBC   Recent Labs   Lab 04/27/20 2045 04/28/20  0000 04/28/20  0400   WBC 6.82 6.09 6.71   HGB 15.5 14.9 14.9   HCT 46.5 46.0 45.5    204 195   , INR   Recent Labs   Lab 04/27/20 1127 04/27/20 2045 04/28/20  0100   INR 1.0 1.1 1.1    and Troponin   Recent Labs   Lab 04/27/20 1127 04/27/20  1330   TROPONINI 0.13* 0.33*       Significant Imaging: All imaging has been reviewed int he last 24 hours

## 2020-04-28 NOTE — PLAN OF CARE
SW is following this Pt for DC planning needs. There are no identified needs at this time.    SW will continue to coordinate with patient, family, team and insurance to complete patient's discharge plan.    Rosalva Ocasio LMSW   - Case Management

## 2020-04-28 NOTE — SUBJECTIVE & OBJECTIVE
Past Medical History:   Diagnosis Date    Bronchitis     Bulging of cervical intervertebral disc     Cervical radiculopathy     Cervical spondylosis     Chronic neck pain     Chronic sinusitis     DDD (degenerative disc disease), cervical     GERD (gastroesophageal reflux disease)     HLD (hyperlipidemia)     Hypertension     Osteoarthritis     Osteoarthritis of spine with radiculopathy     Right rotator cuff tear     Wears dentures        Past Surgical History:   Procedure Laterality Date    ARTHROSCOPIC REPAIR OF ROTATOR CUFF OF SHOULDER Right 1/24/2020    Procedure: REPAIR, ROTATOR CUFF, ARTHROSCOPIC, Loaiza and Nephcriss, beach chair, 1st assist;  Surgeon: Aureliano Cárdenas MD;  Location: [facility];  Service: Orthopedics;  Laterality: Right;  FIRST ASSISTANT CONFIRMED 1/17/DME  LOAIZA & NEPHEW CONFIRMED 1/22/DME    ARTHROSCOPY OF SHOULDER WITH DECOMPRESSION OF SUBACROMIAL SPACE Right 01/24/2020    DECOMPRESSION OF SUBACROMIAL SPACE Right 1/24/2020    Procedure: DECOMPRESSION, SUBACROMIAL SPACE;  Surgeon: Aureliano Cárdenas MD;  Location: [facility];  Service: Orthopedics;  Laterality: Right;    KNEE SURGERY Right        Review of patient's allergies indicates:  No Known Allergies    Current Facility-Administered Medications on File Prior to Encounter   Medication    [COMPLETED] aspirin EC tablet 325 mg    [COMPLETED] enoxaparin injection 120 mg    [COMPLETED] iohexoL (OMNIPAQUE 350) injection 80 mL    [DISCONTINUED] enoxaparin (LOVENOX) 60 mg/0.6 mL injection     Current Outpatient Medications on File Prior to Encounter   Medication Sig    HYDROcodone-acetaminophen (NORCO) 5-325 mg per tablet Take 1-2 tablets by mouth every 8 (eight) hours as needed for Pain.    HYDROcodone-acetaminophen (NORCO) 5-325 mg per tablet Take 1-2 tablets by mouth every 8 (eight) hours as needed for Pain.    lisinopril-hydrochlorothiazide (PRINZIDE,ZESTORETIC) 20-25 mg Tab TAKE 1 TABLET BY MOUTH ONCE DAILY     omeprazole (PRILOSEC) 40 MG capsule Take 1 capsule (40 mg total) by mouth once daily.    pravastatin (PRAVACHOL) 20 MG tablet TAKE 1 TABLET BY MOUTH ONCE DAILY    traZODone (DESYREL) 50 MG tablet Take 1 tablet (50 mg total) by mouth every evening.    [DISCONTINUED] meloxicam (MOBIC) 7.5 MG tablet Take 7.5 mg by mouth once daily.    [DISCONTINUED] oxyCODONE-acetaminophen (PERCOCET) 5-325 mg per tablet Take 1-2 tablets by mouth every 6 (six) hours as needed for Pain.    [DISCONTINUED] oxyCODONE-acetaminophen (PERCOCET) 5-325 mg per tablet Take 1-2 tablets by mouth every 6 (six) hours as needed for Pain.     Family History     Family history is unknown by patient.        Tobacco Use    Smoking status: Never Smoker    Smokeless tobacco: Never Used   Substance and Sexual Activity    Alcohol use: No    Drug use: No    Sexual activity: Yes     Review of Systems   Constitution: Positive for malaise/fatigue. Negative for chills and decreased appetite.   HENT: Negative for congestion.    Cardiovascular: Positive for chest pain. Negative for irregular heartbeat and leg swelling.   Respiratory: Positive for shortness of breath. Negative for cough.    Endocrine: Negative for cold intolerance and heat intolerance.   Skin: Negative for rash.   Musculoskeletal: Negative for arthritis and back pain.   Gastrointestinal: Negative for abdominal pain, constipation and diarrhea.   Genitourinary: Negative for dysuria and hematuria.   Neurological: Negative for dizziness and headaches.   Psychiatric/Behavioral: Negative for altered mental status.     Objective:     Vital Signs (Most Recent):  Temp: 98 °F (36.7 °C) (04/27/20 2000)  Pulse: 95 (04/27/20 2000)  Resp: 18 (04/27/20 2000)  BP: (!) 134/95 (04/27/20 2000)  SpO2: 97 % (04/27/20 2000) Vital Signs (24h Range):  Temp:  [98 °F (36.7 °C)-98.4 °F (36.9 °C)] 98 °F (36.7 °C)  Pulse:  [] 95  Resp:  [] 18  SpO2:  [86 %-97 %] 97 %  BP: ()/() 134/95     Weight:  118.8 kg (261 lb 14.5 oz)  Body mass index is 35.52 kg/m².    SpO2: 97 %  O2 Device (Oxygen Therapy): nasal cannula    No intake or output data in the 24 hours ending 04/27/20 2138    Lines/Drains/Airways     Peripheral Intravenous Line                 Peripheral IV - Single Lumen 04/27/20 1127 18 G Right Forearm less than 1 day         Peripheral IV - Single Lumen 04/27/20 1745 18 G Left Forearm less than 1 day                Physical Exam   Constitutional: He is oriented to person, place, and time. He appears well-developed and well-nourished. No distress.   HENT:   Head: Normocephalic.   Left Ear: External ear normal.   Eyes: Pupils are equal, round, and reactive to light. Right eye exhibits no discharge. Left eye exhibits no discharge.   Neck: Normal range of motion. No thyromegaly present.   Cardiovascular: Normal rate and regular rhythm. Exam reveals no friction rub.   No murmur heard.  Pulmonary/Chest: Effort normal. No respiratory distress. He has wheezes. He has rales.   Abdominal: Soft. Bowel sounds are normal. He exhibits no distension. There is no tenderness.   Musculoskeletal: Normal range of motion. He exhibits no edema.   Neurological: He is alert and oriented to person, place, and time. No cranial nerve deficit.       Significant Labs:   BMP:   Recent Labs   Lab 04/27/20  1127         K 3.5   CL 99*   CO2 26   BUN 13   CREATININE 1.3   CALCIUM 9.9   , CBC   Recent Labs   Lab 04/27/20  1127 04/27/20  2045   WBC 7.90 6.82   HGB 15.6 15.5   HCT 45.7 46.5    198    and All pertinent lab results from the last 24 hours have been reviewed.

## 2020-04-28 NOTE — ASSESSMENT & PLAN NOTE
- Presentation with most likely provoked PE after his long car trip ~ 18 hours in two days   - CTA showed extensive filling defects compatible with thromboemboli in the pulmonary arteries bilaterally.  - Pulmonary Embolism Severity Index (PESI) is 110 (hypoxia and tachycardia)  - Bedside Echo showed RV strain with RV/LV diameter ratio ~ 1 with moderate RV enlargement   - Started on heparin and consulted interventional cardiology to proceed with catheter directed thrombolysis.

## 2020-04-28 NOTE — ASSESSMENT & PLAN NOTE
Presentation with most likely provoked PE after his long car trip ~ 18 hours in two days. CTA showed extensive filling defects compatible with thromboemboli in the pulmonary arteries bilaterally.  Pulmonary Embolism Severity Index (PESI) is 110 (hypoxia and tachycardia)  - Bedside Echo showed RV strain with RV/LV diameter ratio ~ 1 with moderate RV enlargement     Repeat TTE: Normal left ventricular systolic function. The estimated ejection fraction is 55%. Normal LV diastolic function.  Mild right ventricular enlargement. Moderately to severely reduced right ventricular systolic function.    PLAN:  -- Discontinued tPA after 15 hours with IJ removed  -- Continue heparin gtt till 8:45 PM and then start therapeutic Lovenox X1 in order to bridge him to start Eliquis in the morning 10 mg BID X10 days and then 5 mg BID after that for life long treatment

## 2020-04-28 NOTE — PROGRESS NOTES
Dr Montana @ bedside to d/c R IJ catheter. TPA paused since 1359, heparin still infusing. MD aware. Pressure held per MD, site without complication. Will continue to monitor site for bleeding.

## 2020-04-28 NOTE — SUBJECTIVE & OBJECTIVE
Past Medical History:   Diagnosis Date    Bronchitis     Bulging of cervical intervertebral disc     Cervical radiculopathy     Cervical spondylosis     Chronic neck pain     Chronic sinusitis     DDD (degenerative disc disease), cervical     GERD (gastroesophageal reflux disease)     HLD (hyperlipidemia)     Hypertension     Osteoarthritis     Osteoarthritis of spine with radiculopathy     Right rotator cuff tear     Wears dentures        Past Surgical History:   Procedure Laterality Date    ARTHROSCOPIC REPAIR OF ROTATOR CUFF OF SHOULDER Right 1/24/2020    Procedure: REPAIR, ROTATOR CUFF, ARTHROSCOPIC, Loaiza and Nephcriss, beach chair, 1st assist;  Surgeon: Aureliano Cárdenas MD;  Location: [facility];  Service: Orthopedics;  Laterality: Right;  FIRST ASSISTANT CONFIRMED 1/17/DME  LOAIZA & NEPHEW CONFIRMED 1/22/DME    ARTHROSCOPY OF SHOULDER WITH DECOMPRESSION OF SUBACROMIAL SPACE Right 01/24/2020    DECOMPRESSION OF SUBACROMIAL SPACE Right 1/24/2020    Procedure: DECOMPRESSION, SUBACROMIAL SPACE;  Surgeon: Aureliano Cárdenas MD;  Location: [facility];  Service: Orthopedics;  Laterality: Right;    KNEE SURGERY Right        Review of patient's allergies indicates:  No Known Allergies    Current Facility-Administered Medications on File Prior to Encounter   Medication    [COMPLETED] aspirin EC tablet 325 mg    [COMPLETED] enoxaparin injection 120 mg    [COMPLETED] iohexoL (OMNIPAQUE 350) injection 80 mL    [DISCONTINUED] enoxaparin (LOVENOX) 60 mg/0.6 mL injection     Current Outpatient Medications on File Prior to Encounter   Medication Sig    HYDROcodone-acetaminophen (NORCO) 5-325 mg per tablet Take 1-2 tablets by mouth every 8 (eight) hours as needed for Pain.    HYDROcodone-acetaminophen (NORCO) 5-325 mg per tablet Take 1-2 tablets by mouth every 8 (eight) hours as needed for Pain.    lisinopril-hydrochlorothiazide (PRINZIDE,ZESTORETIC) 20-25 mg Tab TAKE 1 TABLET BY MOUTH ONCE DAILY     omeprazole (PRILOSEC) 40 MG capsule Take 1 capsule (40 mg total) by mouth once daily.    pravastatin (PRAVACHOL) 20 MG tablet TAKE 1 TABLET BY MOUTH ONCE DAILY    traZODone (DESYREL) 50 MG tablet Take 1 tablet (50 mg total) by mouth every evening.    [DISCONTINUED] meloxicam (MOBIC) 7.5 MG tablet Take 7.5 mg by mouth once daily.    [DISCONTINUED] oxyCODONE-acetaminophen (PERCOCET) 5-325 mg per tablet Take 1-2 tablets by mouth every 6 (six) hours as needed for Pain.    [DISCONTINUED] oxyCODONE-acetaminophen (PERCOCET) 5-325 mg per tablet Take 1-2 tablets by mouth every 6 (six) hours as needed for Pain.     Family History     Family history is unknown by patient.        Tobacco Use    Smoking status: Never Smoker    Smokeless tobacco: Never Used   Substance and Sexual Activity    Alcohol use: No    Drug use: No    Sexual activity: Yes     Review of Systems   Constitution: Positive for malaise/fatigue. Negative for chills and decreased appetite.   HENT: Negative for congestion.    Cardiovascular: Positive for chest pain. Negative for irregular heartbeat and leg swelling.   Respiratory: Positive for shortness of breath. Negative for cough.    Endocrine: Negative for cold intolerance and heat intolerance.   Skin: Negative for rash.   Musculoskeletal: Negative for arthritis and back pain.   Gastrointestinal: Negative for abdominal pain, constipation and diarrhea.   Genitourinary: Negative for dysuria and hematuria.   Neurological: Negative for dizziness and headaches.   Psychiatric/Behavioral: Negative for altered mental status.     Objective:     Vital Signs (Most Recent):  Temp: 98 °F (36.7 °C) (04/27/20 2000)  Pulse: 95 (04/27/20 2000)  Resp: 18 (04/27/20 2000)  BP: (!) 134/95 (04/27/20 2000)  SpO2: 97 % (04/27/20 2000) Vital Signs (24h Range):  Temp:  [98 °F (36.7 °C)-98.4 °F (36.9 °C)] 98 °F (36.7 °C)  Pulse:  [] 95  Resp:  [] 18  SpO2:  [86 %-97 %] 97 %  BP: ()/() 134/95     Weight:  118.8 kg (261 lb 14.5 oz)  Body mass index is 35.52 kg/m².    SpO2: 97 %  O2 Device (Oxygen Therapy): nasal cannula    No intake or output data in the 24 hours ending 04/27/20 2132    Lines/Drains/Airways     Peripheral Intravenous Line                 Peripheral IV - Single Lumen 04/27/20 1127 18 G Right Forearm less than 1 day         Peripheral IV - Single Lumen 04/27/20 1745 18 G Left Forearm less than 1 day                Physical Exam   Constitutional: He is oriented to person, place, and time. He appears well-developed and well-nourished. No distress.   HENT:   Head: Normocephalic.   Left Ear: External ear normal.   Eyes: Pupils are equal, round, and reactive to light. Right eye exhibits no discharge. Left eye exhibits no discharge.   Neck: Normal range of motion. No thyromegaly present.   Cardiovascular: Normal rate and regular rhythm. Exam reveals no friction rub.   No murmur heard.  Pulmonary/Chest: Effort normal. No respiratory distress. He has wheezes. He has rales.   Abdominal: Soft. Bowel sounds are normal. He exhibits no distension. There is no tenderness.   Musculoskeletal: Normal range of motion. He exhibits no edema.   Neurological: He is alert and oriented to person, place, and time. No cranial nerve deficit.       Significant Labs:   BMP:   Recent Labs   Lab 04/27/20  1127         K 3.5   CL 99*   CO2 26   BUN 13   CREATININE 1.3   CALCIUM 9.9   , CBC   Recent Labs   Lab 04/27/20  1127 04/27/20  2045   WBC 7.90 6.82   HGB 15.6 15.5   HCT 45.7 46.5    198    and All pertinent lab results from the last 24 hours have been reviewed.

## 2020-04-28 NOTE — PLAN OF CARE
Pt resting comfortably. Afebrile. AAOx4. HR 80s-90s SR, infrequent PVCs. SBP maintianed < 160, home BP meds restarted this AM. No complaints of chest pain. Bilat LE U/S negative for DVT, echo done EF 55%. Currently on RA sating > 95%. No bowel movement, voids per urinal. Skin intact. R IJ sheath pulled this afternoon by MD. No signs of complication, dsg CDI. Pt complaining of soreness around insertion site, but no throbbing/shooting pain. No breakdown noted. Sacrum and heels CDI, sacral foam in place. Assist x 1 to chair. Cardiac diet, q 6 accuchecks. No coverage required. Gtts: heparin @ 16u/kg/hr. Ptt due 8pm tonight. Plan to discontinue heparin drip and transition to lovenox tonight and eliquis tomorrow AM. Plan for discharge tomorrow as well. VS currently stable. Pt and family updated on POC. See chart for full assessment. Will continue to monitor.

## 2020-04-28 NOTE — PROGRESS NOTES
[facility]  Cardiology  Progress Note    Patient Name: Willy Delgado  MRN: [id number]  Admission Date: 4/27/2020  Hospital Length of Stay: 1 days  Code Status: Full Code   Attending Physician: Charity Hilliard MD   Primary Care Physician: Addy Workman MD  Expected Discharge Date: 5/2/2020  Principal Problem:Bilateral pulmonary embolism    Subjective:     Hospital Course:   Mr. Delgado is a 60 y/o M presenting with a submassive pulmonary embolus for which he underwent catheter directed thrombolysis on 4/27. PESI score 110. Patient had tPA discontinued after 15 hours with Heparin continuation in plans to start patient on life long therapy of Eliquis started on 4/29    Interval History: Patient underwent catheter direct thrombolysis. tPA discontinued after 15 hours. Heparin to be continued till 8:45 PM with change over to theraputic Lovenox at 9:00 PM and begin eliquis in the morning    Review of Systems   Constitution: Negative for chills, diaphoresis and fever.   Eyes: Negative for blurred vision and double vision.   Cardiovascular: Negative for chest pain, cyanosis and palpitations.   Respiratory: Negative for cough, hemoptysis and wheezing.    Musculoskeletal: Positive for joint pain (recent shoulder repair). Negative for back pain.   Gastrointestinal: Negative for bloating and abdominal pain.   Genitourinary: Negative for dysuria, flank pain and frequency.   Neurological: Negative for headaches, light-headedness and weakness.   Psychiatric/Behavioral: Negative for altered mental status. The patient is not nervous/anxious.      Objective:     Vital Signs (Most Recent):  Temp: 98.1 °F (36.7 °C) (04/28/20 1515)  Pulse: 89 (04/28/20 1700)  Resp: 17 (04/28/20 1700)  BP: (!) 138/94 (04/28/20 1630)  SpO2: 97 % (04/28/20 1700) Vital Signs (24h Range):  Temp:  [97.5 °F (36.4 °C)-98.1 °F (36.7 °C)] 98.1 °F (36.7 °C)  Pulse:  [72-96] 89  Resp:  [8-39] 17  SpO2:  [93 %-98 %] 97 %  BP:  (114-163)/() 138/94     Weight: 120.7 kg (266 lb)  Body mass index is 36.08 kg/m².     SpO2: 97 %  O2 Device (Oxygen Therapy): room air      Intake/Output Summary (Last 24 hours) at 4/28/2020 1753  Last data filed at 4/28/2020 1300  Gross per 24 hour   Intake 534 ml   Output 1350 ml   Net -816 ml       Lines/Drains/Airways     Drain                 Sheath 04/27/20 2300 Right less than 1 day          Peripheral Intravenous Line                 Peripheral IV - Single Lumen 04/27/20 1127 18 G Right Forearm 1 day         Peripheral IV - Single Lumen 04/27/20 1745 18 G Left Forearm 1 day                Physical Exam   Constitutional: He is oriented to person, place, and time. He appears well-developed and well-nourished. No distress.   HENT:   Head: Normocephalic and atraumatic.   Eyes: Pupils are equal, round, and reactive to light. EOM are normal.   Neck: Neck supple.   IJ removed   Cardiovascular: Normal rate and regular rhythm.   Pulmonary/Chest: Effort normal and breath sounds normal. No respiratory distress. He exhibits no tenderness.   Abdominal: Soft. Bowel sounds are normal. He exhibits no distension. There is no tenderness.   Musculoskeletal: Normal range of motion. He exhibits no edema.   Neurological: He is alert and oriented to person, place, and time.   Skin: Skin is warm and dry. He is not diaphoretic. No erythema.   Psychiatric: He has a normal mood and affect. His behavior is normal. Judgment and thought content normal.       Significant Labs:   Blood Culture: No results for input(s): LABBLOO in the last 48 hours., CMP   Recent Labs   Lab 04/27/20  1127 04/28/20  0400    136   K 3.5 4.1   CL 99* 101   CO2 26 25    102   BUN 13 9   CREATININE 1.3 0.9   CALCIUM 9.9 9.3   PROT 8.3 7.3   ALBUMIN 4.5 3.6   BILITOT 1.0 0.9   ALKPHOS 83 85   AST 26 26   ALT 21 18   ANIONGAP 14 10   ESTGFRAFRICA >60.0 >60.0   EGFRNONAA 59.3* >60.0   , CBC   Recent Labs   Lab 04/27/20  2045 04/28/20  0000  04/28/20  0400   WBC 6.82 6.09 6.71   HGB 15.5 14.9 14.9   HCT 46.5 46.0 45.5    204 195   , INR   Recent Labs   Lab 04/27/20  1127 04/27/20  2045 04/28/20  0100   INR 1.0 1.1 1.1    and Troponin   Recent Labs   Lab 04/27/20  1127 04/27/20  1330   TROPONINI 0.13* 0.33*       Significant Imaging: All imaging has been reviewed int he last 24 hours    Assessment and Plan:     * Bilateral pulmonary embolism  Presentation with most likely provoked PE after his long car trip ~ 18 hours in two days. CTA showed extensive filling defects compatible with thromboemboli in the pulmonary arteries bilaterally.  Pulmonary Embolism Severity Index (PESI) is 110 (hypoxia and tachycardia)  - Bedside Echo showed RV strain with RV/LV diameter ratio ~ 1 with moderate RV enlargement     Repeat TTE: Normal left ventricular systolic function. The estimated ejection fraction is 55%. Normal LV diastolic function.  Mild right ventricular enlargement. Moderately to severely reduced right ventricular systolic function.    PLAN:  -- Discontinued tPA after 15 hours with IJ removed  -- Continue heparin gtt till 8:45 PM and then start therapeutic Lovenox X1 in order to bridge him to start Eliquis in the morning 10 mg BID X10 days and then 5 mg BID after that for life long treatment     CKD (chronic kidney disease), stage III  - Baseline Creatinine is 1.3, and current stable sCr     PLAN:  - Will continue trending urine output and renal function, avoid NSAIDs, contrast, nephrotoxins      Osteoarthritis of spine with radiculopathy, cervical region  Patient has had recent shoulder surgery.    PLAN:  - Continue Norco Q6H PRN     Hyperlipidemia  PLAN  - Continue statin therapy     Hypertension  PLAN:  -- started back on home lisinopril- HCTZ         VTE Risk Mitigation (From admission, onward)         Ordered     apixaban tablet 10 mg  2 times daily      04/28/20 1624     enoxaparin injection 120 mg  Once      04/28/20 1624     heparin 25,000  units in dextrose 5% 250 mL (100 units/mL) infusion HIGH INTENSITY nomogram - [facility]  Continuous     Question:  Heparin Infusion Adjustment (DO NOT MODIFY ANSWER)  Answer:  \\[email or web address]\epic\Images\Pharmacy\HeparinInfusions\heparin HIGH INTENSITY nomogram for [facility] [id number]    04/28/20 1628     heparin 25,000 units in dextrose 5% (100 units/ml) IV bolus from bag - ADDITIONAL PRN BOLUS - 60 units/kg  As needed (PRN)     Question:  Heparin Infusion Adjustment (DO NOT MODIFY ANSWER)  Answer:  \\[email or web address]\epic\Images\Pharmacy\HeparinInfusions\heparin HIGH INTENSITY nomogram for [facility] [id number]    04/27/20 2037     heparin 25,000 units in dextrose 5% (100 units/ml) IV bolus from bag - ADDITIONAL PRN BOLUS - 30 units/kg  As needed (PRN)     Question:  Heparin Infusion Adjustment (DO NOT MODIFY ANSWER)  Answer:  \\[email or web address]\epic\Images\Pharmacy\HeparinInfusions\heparin HIGH INTENSITY nomogram for [facility] [id number]    04/27/20 2037     IP VTE HIGH RISK PATIENT  Once      04/27/20 2003     Place sequential compression device  Until discontinued      04/27/20 2003                Desiree Woodward DO  Cardiology  [facility]

## 2020-04-28 NOTE — H&P
[facility]  Cardiology  History and Physical     Patient Name: Willy Delgado  MRN: [id number]  Admission Date: 4/27/2020  Code Status: Full Code   Attending Provider: Charity Hilliard MD   Primary Care Physician: Addy Workman MD  Principal Problem:Bilateral pulmonary embolism    Patient information was obtained from patient and ER records.     Subjective:     Chief Complaint:  chest pain      HPI:  Mr. Willy Delgado, 59 y.o. male with prior history of Hypertension, Hyperlipidemia, and prior PE in 2002 was provoked by knee surgery and finished coumadin 6 months and stopped. He was at his baseline health until his surgery of his Right shoulder surgery for Rotator cuff impingement syndrome on 1/24 he has been taking oxycodon for pain. He became less active than before. Last Friday, 4/25 He had travelled to [place], [place] last week by car 8 hour followed by return to [place], [place] in the next day. Today he noted a worsening episode of shortness of breath when he ambulate himself to his car and it was associated with left sided chest pain described as pleuritic worsen with deep breath. He denies prior chest pain like this before, he denies palpitation, loss of consciousness or change in his medications. On arrival he underwent CTA and showed extensive PE. Admitted to cardiology for submassive PE.     Past Medical History:   Diagnosis Date    Bronchitis     Bulging of cervical intervertebral disc     Cervical radiculopathy     Cervical spondylosis     Chronic neck pain     Chronic sinusitis     DDD (degenerative disc disease), cervical     GERD (gastroesophageal reflux disease)     HLD (hyperlipidemia)     Hypertension     Osteoarthritis     Osteoarthritis of spine with radiculopathy     Right rotator cuff tear     Wears dentures        Past Surgical History:   Procedure Laterality Date    ARTHROSCOPIC REPAIR OF ROTATOR CUFF OF SHOULDER Right 1/24/2020    Procedure: REPAIR,  ROTATOR CUFF, ARTHROSCOPIC, Loaiza and Mary Kay, beach chair, 1st assist;  Surgeon: Aureliano Cárdenas MD;  Location: [facility] OR;  Service: Orthopedics;  Laterality: Right;  FIRST ASSISTANT CONFIRMED 1/17/DME  LOAIZA & NEPHEW CONFIRMED 1/22/DME    ARTHROSCOPY OF SHOULDER WITH DECOMPRESSION OF SUBACROMIAL SPACE Right 01/24/2020    DECOMPRESSION OF SUBACROMIAL SPACE Right 1/24/2020    Procedure: DECOMPRESSION, SUBACROMIAL SPACE;  Surgeon: Aureliano Cárdenas MD;  Location: [facility] OR;  Service: Orthopedics;  Laterality: Right;    KNEE SURGERY Right        Review of patient's allergies indicates:  No Known Allergies    Current Facility-Administered Medications on File Prior to Encounter   Medication    [COMPLETED] aspirin EC tablet 325 mg    [COMPLETED] enoxaparin injection 120 mg    [COMPLETED] iohexoL (OMNIPAQUE 350) injection 80 mL    [DISCONTINUED] enoxaparin (LOVENOX) 60 mg/0.6 mL injection     Current Outpatient Medications on File Prior to Encounter   Medication Sig    HYDROcodone-acetaminophen (NORCO) 5-325 mg per tablet Take 1-2 tablets by mouth every 8 (eight) hours as needed for Pain.    HYDROcodone-acetaminophen (NORCO) 5-325 mg per tablet Take 1-2 tablets by mouth every 8 (eight) hours as needed for Pain.    lisinopril-hydrochlorothiazide (PRINZIDE,ZESTORETIC) 20-25 mg Tab TAKE 1 TABLET BY MOUTH ONCE DAILY    omeprazole (PRILOSEC) 40 MG capsule Take 1 capsule (40 mg total) by mouth once daily.    pravastatin (PRAVACHOL) 20 MG tablet TAKE 1 TABLET BY MOUTH ONCE DAILY    traZODone (DESYREL) 50 MG tablet Take 1 tablet (50 mg total) by mouth every evening.    [DISCONTINUED] meloxicam (MOBIC) 7.5 MG tablet Take 7.5 mg by mouth once daily.    [DISCONTINUED] oxyCODONE-acetaminophen (PERCOCET) 5-325 mg per tablet Take 1-2 tablets by mouth every 6 (six) hours as needed for Pain.    [DISCONTINUED] oxyCODONE-acetaminophen (PERCOCET) 5-325 mg per tablet Take 1-2 tablets by mouth every 6 (six) hours as  needed for Pain.     Family History       Family history is unknown by patient.          Tobacco Use    Smoking status: Never Smoker    Smokeless tobacco: Never Used   Substance and Sexual Activity    Alcohol use: No    Drug use: No    Sexual activity: Yes     Review of Systems   Constitution: Positive for malaise/fatigue. Negative for chills and decreased appetite.   HENT: Negative for congestion.    Cardiovascular: Positive for chest pain. Negative for irregular heartbeat and leg swelling.   Respiratory: Positive for shortness of breath. Negative for cough.    Endocrine: Negative for cold intolerance and heat intolerance.   Skin: Negative for rash.   Musculoskeletal: Negative for arthritis and back pain.   Gastrointestinal: Negative for abdominal pain, constipation and diarrhea.   Genitourinary: Negative for dysuria and hematuria.   Neurological: Negative for dizziness and headaches.   Psychiatric/Behavioral: Negative for altered mental status.     Objective:     Vital Signs (Most Recent):  Temp: 98 °F (36.7 °C) (04/27/20 2000)  Pulse: 95 (04/27/20 2000)  Resp: 18 (04/27/20 2000)  BP: (!) 134/95 (04/27/20 2000)  SpO2: 97 % (04/27/20 2000) Vital Signs (24h Range):  Temp:  [98 °F (36.7 °C)-98.4 °F (36.9 °C)] 98 °F (36.7 °C)  Pulse:  [] 95  Resp:  [] 18  SpO2:  [86 %-97 %] 97 %  BP: ()/() 134/95     Weight: 118.8 kg (261 lb 14.5 oz)  Body mass index is 35.52 kg/m².    SpO2: 97 %  O2 Device (Oxygen Therapy): nasal cannula    No intake or output data in the 24 hours ending 04/27/20 2138    Lines/Drains/Airways       Peripheral Intravenous Line                   Peripheral IV - Single Lumen 04/27/20 1127 18 G Right Forearm less than 1 day         Peripheral IV - Single Lumen 04/27/20 1745 18 G Left Forearm less than 1 day                    Physical Exam   Constitutional: He is oriented to person, place, and time. He appears well-developed and well-nourished. No distress.   HENT:   Head:  Normocephalic.   Left Ear: External ear normal.   Eyes: Pupils are equal, round, and reactive to light. Right eye exhibits no discharge. Left eye exhibits no discharge.   Neck: Normal range of motion. No thyromegaly present.   Cardiovascular: Normal rate and regular rhythm. Exam reveals no friction rub.   No murmur heard.  Pulmonary/Chest: Effort normal. No respiratory distress. He has wheezes. He has rales.   Abdominal: Soft. Bowel sounds are normal. He exhibits no distension. There is no tenderness.   Musculoskeletal: Normal range of motion. He exhibits no edema.   Neurological: He is alert and oriented to person, place, and time. No cranial nerve deficit.       Significant Labs:   BMP:   Recent Labs   Lab 04/27/20  1127         K 3.5   CL 99*   CO2 26   BUN 13   CREATININE 1.3   CALCIUM 9.9   , CBC   Recent Labs   Lab 04/27/20  1127 04/27/20  2045   WBC 7.90 6.82   HGB 15.6 15.5   HCT 45.7 46.5    198    and All pertinent lab results from the last 24 hours have been reviewed.    Assessment and Plan:     * Bilateral pulmonary embolism  - Presentation with most likely provoked PE after his long car trip ~ 18 hours in two days   - CTA showed extensive filling defects compatible with thromboemboli in the pulmonary arteries bilaterally.  - Pulmonary Embolism Severity Index (PESI) is 110 (hypoxia and tachycardia)  - Bedside Echo showed RV strain with RV/LV diameter ratio ~ 1 with moderate RV enlargement   - Started on heparin and consulted interventional cardiology to proceed with catheter directed thrombolysis.     CKD (chronic kidney disease), stage III  - Baseline Creatinine is 1.3, and current stable sCr   - Will continue trending urine output and renal function, avoid NSAIDs, contrast, nephrotoxins      Osteoarthritis of spine with radiculopathy, cervical region  - Continue Norco  PRN     Hyperlipidemia  - Continue statin therapy     Hypertension  - Normotensive holding his blood pressure given  acute intervention with catheter directed thrombolysis     VTE Risk Mitigation (From admission, onward)         Ordered     heparin 25,000 units in dextrose 5% (100 units/ml) IV bolus from bag INITIAL BOLUS  Once     Question:  Heparin Infusion Adjustment (DO NOT MODIFY ANSWER)  Answer:  [email or web address]\epic\Images\Pharmacy\HeparinInfusions\heparin HIGH INTENSITY nomogram for [facility] [id number].pdf    04/27/20 2037     heparin 25,000 units in dextrose 5% 250 mL (100 units/mL) infusion HIGH INTENSITY nomogram - [facility]  Continuous     Question:  Heparin Infusion Adjustment (DO NOT MODIFY ANSWER)  Answer:  [email or web address]\epic\Images\Pharmacy\HeparinInfusions\heparin HIGH INTENSITY nomogram for [facility] [id number].pdf    04/27/20 2037     heparin 25,000 units in dextrose 5% (100 units/ml) IV bolus from bag - ADDITIONAL PRN BOLUS - 60 units/kg  As needed (PRN)     Question:  Heparin Infusion Adjustment (DO NOT MODIFY ANSWER)  Answer:  [email or web address]\epic\Images\Pharmacy\HeparinInfusions\heparin HIGH INTENSITY nomogram for [facility] [id number]    04/27/20 2037     heparin 25,000 units in dextrose 5% (100 units/ml) IV bolus from bag - ADDITIONAL PRN BOLUS - 30 units/kg  As needed (PRN)     Question:  Heparin Infusion Adjustment (DO NOT MODIFY ANSWER)  Answer:  [email or web address]\epic\Images\Pharmacy\HeparinInfusions\heparin HIGH INTENSITY nomogram for [facility] [id number].pdf    04/27/20 2037     IP VTE HIGH RISK PATIENT  Once      04/27/20 2003     Place sequential compression device  Until discontinued      04/27/20 2003                Jake Gonzales MD  Cardiology   [facility]

## 2020-04-28 NOTE — ASSESSMENT & PLAN NOTE
- Baseline Creatinine is 1.3, and current stable sCr   - Will continue trending urine output and renal function, avoid NSAIDs, contrast, nephrotoxins

## 2020-04-28 NOTE — PLAN OF CARE
No acute events this shift. AAOx4. Afebrile. MAP > 65. HR: 70-80s, NSR.  RA, SpO2 > 90%  Gtts: Heparin @ 18 units/kg/min, TPA @ 20 mL/hr, NS @ 30 mL/hr  UO: 850 cc this shift  PA pressures: 37/15 (25), 36/14 (23)  NPO. BG monitored. 125 mL 10% Dextrose given.  Plan of care reviewed with pt and pt's children. All questions and concerns addressed. All VSS at this time. WCTM.    Skin note: foam applied to sacrum and heels

## 2020-04-28 NOTE — CONSULTS
[facility]  Cardiology  Consult Note    Patient Name: Willy Delgado  MRN: [id number]  Admission Date: 4/27/2020  Hospital Length of Stay: 0 days  Code Status: Full Code   Attending Provider: Charity Hilliard MD   Consulting Provider: Jake Gonzales MD  Primary Care Physician: Addy Workman MD  Principal Problem:Bilateral pulmonary embolism    Patient information was obtained from patient and ER records.     Consults  Subjective:     Chief Complaint:  chest pain      HPI:   Mr. Willy Delgado, 59 y.o. male with prior history of Hypertension, Hyperlipidemia, and prior PE in 2002 was provoked by knee surgery and finished coumadin 6 months and stopped. He was at his baseline health until his surgery of his Right shoulder surgery for Rotator cuff impingement syndrome on 1/24 he has been taking oxycodon for pain. He became less active than before. Last Friday, 4/25 He had travelled to [place], [place] last week by car 8 hour followed by return to [place], [place] in the next day. Today he noted a worsening episode of shortness of breath when he ambulate himself to his car and it was associated with left sided chest pain described as pleuritic worsen with deep breath. He denies prior chest pain like this before, he denies palpitation, loss of consciousness or change in his medications. On arrival he underwent CTA and showed extensive PE. Admitted to cardiology for submassive PE.     Past Medical History:   Diagnosis Date    Bronchitis     Bulging of cervical intervertebral disc     Cervical radiculopathy     Cervical spondylosis     Chronic neck pain     Chronic sinusitis     DDD (degenerative disc disease), cervical     GERD (gastroesophageal reflux disease)     HLD (hyperlipidemia)     Hypertension     Osteoarthritis     Osteoarthritis of spine with radiculopathy     Right rotator cuff tear     Wears dentures        Past Surgical History:   Procedure Laterality Date    ARTHROSCOPIC  REPAIR OF ROTATOR CUFF OF SHOULDER Right 1/24/2020    Procedure: REPAIR, ROTATOR CUFF, ARTHROSCOPIC, Loaiza and Nephcriss, beach chair, 1st assist;  Surgeon: Aureliano Cárdenas MD;  Location: [facility] OR;  Service: Orthopedics;  Laterality: Right;  FIRST ASSISTANT CONFIRMED 1/17/DME  LOAIZA & NEPHEW CONFIRMED 1/22/DME    ARTHROSCOPY OF SHOULDER WITH DECOMPRESSION OF SUBACROMIAL SPACE Right 01/24/2020    DECOMPRESSION OF SUBACROMIAL SPACE Right 1/24/2020    Procedure: DECOMPRESSION, SUBACROMIAL SPACE;  Surgeon: Aureliano Cárdenas MD;  Location: [facility] OR;  Service: Orthopedics;  Laterality: Right;    KNEE SURGERY Right        Review of patient's allergies indicates:  No Known Allergies    Current Facility-Administered Medications on File Prior to Encounter   Medication    [COMPLETED] aspirin EC tablet 325 mg    [COMPLETED] enoxaparin injection 120 mg    [COMPLETED] iohexoL (OMNIPAQUE 350) injection 80 mL    [DISCONTINUED] enoxaparin (LOVENOX) 60 mg/0.6 mL injection     Current Outpatient Medications on File Prior to Encounter   Medication Sig    HYDROcodone-acetaminophen (NORCO) 5-325 mg per tablet Take 1-2 tablets by mouth every 8 (eight) hours as needed for Pain.    HYDROcodone-acetaminophen (NORCO) 5-325 mg per tablet Take 1-2 tablets by mouth every 8 (eight) hours as needed for Pain.    lisinopril-hydrochlorothiazide (PRINZIDE,ZESTORETIC) 20-25 mg Tab TAKE 1 TABLET BY MOUTH ONCE DAILY    omeprazole (PRILOSEC) 40 MG capsule Take 1 capsule (40 mg total) by mouth once daily.    pravastatin (PRAVACHOL) 20 MG tablet TAKE 1 TABLET BY MOUTH ONCE DAILY    traZODone (DESYREL) 50 MG tablet Take 1 tablet (50 mg total) by mouth every evening.    [DISCONTINUED] meloxicam (MOBIC) 7.5 MG tablet Take 7.5 mg by mouth once daily.    [DISCONTINUED] oxyCODONE-acetaminophen (PERCOCET) 5-325 mg per tablet Take 1-2 tablets by mouth every 6 (six) hours as needed for Pain.    [DISCONTINUED] oxyCODONE-acetaminophen  (PERCOCET) 5-325 mg per tablet Take 1-2 tablets by mouth every 6 (six) hours as needed for Pain.     Family History       Family history is unknown by patient.          Tobacco Use    Smoking status: Never Smoker    Smokeless tobacco: Never Used   Substance and Sexual Activity    Alcohol use: No    Drug use: No    Sexual activity: Yes     Review of Systems   Constitution: Positive for malaise/fatigue. Negative for chills and decreased appetite.   HENT: Negative for congestion.    Cardiovascular: Positive for chest pain. Negative for irregular heartbeat and leg swelling.   Respiratory: Positive for shortness of breath. Negative for cough.    Endocrine: Negative for cold intolerance and heat intolerance.   Skin: Negative for rash.   Musculoskeletal: Negative for arthritis and back pain.   Gastrointestinal: Negative for abdominal pain, constipation and diarrhea.   Genitourinary: Negative for dysuria and hematuria.   Neurological: Negative for dizziness and headaches.   Psychiatric/Behavioral: Negative for altered mental status.     Objective:     Vital Signs (Most Recent):  Temp: 98 °F (36.7 °C) (04/27/20 2000)  Pulse: 95 (04/27/20 2000)  Resp: 18 (04/27/20 2000)  BP: (!) 134/95 (04/27/20 2000)  SpO2: 97 % (04/27/20 2000) Vital Signs (24h Range):  Temp:  [98 °F (36.7 °C)-98.4 °F (36.9 °C)] 98 °F (36.7 °C)  Pulse:  [] 95  Resp:  [] 18  SpO2:  [86 %-97 %] 97 %  BP: ()/() 134/95     Weight: 118.8 kg (261 lb 14.5 oz)  Body mass index is 35.52 kg/m².    SpO2: 97 %  O2 Device (Oxygen Therapy): nasal cannula    No intake or output data in the 24 hours ending 04/27/20 2132    Lines/Drains/Airways       Peripheral Intravenous Line                   Peripheral IV - Single Lumen 04/27/20 1127 18 G Right Forearm less than 1 day         Peripheral IV - Single Lumen 04/27/20 1745 18 G Left Forearm less than 1 day                    Physical Exam   Constitutional: He is oriented to person, place, and  time. He appears well-developed and well-nourished. No distress.   HENT:   Head: Normocephalic.   Left Ear: External ear normal.   Eyes: Pupils are equal, round, and reactive to light. Right eye exhibits no discharge. Left eye exhibits no discharge.   Neck: Normal range of motion. No thyromegaly present.   Cardiovascular: Normal rate and regular rhythm. Exam reveals no friction rub.   No murmur heard.  Pulmonary/Chest: Effort normal. No respiratory distress. He has wheezes. He has rales.   Abdominal: Soft. Bowel sounds are normal. He exhibits no distension. There is no tenderness.   Musculoskeletal: Normal range of motion. He exhibits no edema.   Neurological: He is alert and oriented to person, place, and time. No cranial nerve deficit.       Significant Labs:   BMP:   Recent Labs   Lab 04/27/20  1127         K 3.5   CL 99*   CO2 26   BUN 13   CREATININE 1.3   CALCIUM 9.9   , CBC   Recent Labs   Lab 04/27/20  1127 04/27/20  2045   WBC 7.90 6.82   HGB 15.6 15.5   HCT 45.7 46.5    198    and All pertinent lab results from the last 24 hours have been reviewed.    Assessment and Plan:     * Bilateral pulmonary embolism  - Presentation with most likely provoked PE after his long car trip ~ 18 hours in two days   - CTA showed extensive filling defects compatible with thromboemboli in the pulmonary arteries bilaterally.  - Pulmonary Embolism Severity Index (PESI) is 110 (hypoxia and tachycardia)  - Bedside Echo showed RV strain with RV/LV diameter ratio ~ 1 with moderate RV enlargement   - Proceed with catheter directed thrombolysis.     Access: Right Femoral vein     Renal Risk Score/Predicts risk of contrast-induced nephropathy (ELAN) after PCI:    Prior Shellfish/Contrast Iodine allergy Allergy: No   Pre Cath Med: Diphenhydramine (Benadryl) 50 mg, Oral, Once, Pre-Procedure     Sedation   Type of sedation: RN IV sedation    Mallampati score: III   ASA score: III    Informed Consent  -The risks,  benefits & alternatives of the procedure were explained to the patient.    -The risks of coronary angiography include but are not limited to: Bleeding, infection, heart rhythm abnormalities, allergic reactions, kidney injury, stroke and death.    -The risks of moderate sedation include hypotension, respiratory depression, arrhythmias, bronchospasm, & death.    -Informed consent was obtained & the patient is agreeable to proceed with the procedure.  -This patient will be discussed in the morning discussion with interventional cardiology staff     VTE Risk Mitigation (From admission, onward)         Ordered     heparin 25,000 units in dextrose 5% (100 units/ml) IV bolus from bag INITIAL BOLUS  Once     Question:  Heparin Infusion Adjustment (DO NOT MODIFY ANSWER)  Answer:  [email or web address]\epic\Images\Pharmacy\HeparinInfusions\heparin HIGH INTENSITY nomogram for [facility] [id number]    04/27/20 2037     heparin 25,000 units in dextrose 5% 250 mL (100 units/mL) infusion HIGH INTENSITY nomogram - [facility]  Continuous     Question:  Heparin Infusion Adjustment (DO NOT MODIFY ANSWER)  Answer:  [email or web address]\epic\Images\Pharmacy\HeparinInfusions\heparin HIGH INTENSITY nomogram for [facility] [id number].pdf    04/27/20 2037     heparin 25,000 units in dextrose 5% (100 units/ml) IV bolus from bag - ADDITIONAL PRN BOLUS - 60 units/kg  As needed (PRN)     Question:  Heparin Infusion Adjustment (DO NOT MODIFY ANSWER)  Answer:  [email or web address]\epic\Images\Pharmacy\HeparinInfusions\heparin HIGH INTENSITY nomogram for [facility] [id number].pdf    04/27/20 2037     heparin 25,000 units in dextrose 5% (100 units/ml) IV bolus from bag - ADDITIONAL PRN BOLUS - 30 units/kg  As needed (PRN)     Question:  Heparin Infusion Adjustment (DO NOT MODIFY ANSWER)  Answer:  [email or web address]\epic\Images\Pharmacy\HeparinInfusions\heparin HIGH INTENSITY nomogram for [facility] [id number].pdf    04/27/20 2037     IP VTE HIGH RISK PATIENT  Once      04/27/20 2003     Place sequential compression  device  Until discontinued      04/27/20 2003                Thank you for your consult. I will follow-up with patient. Please contact us if you have any additional questions.    Jake Gonzales MD  Cardiology   [facility]

## 2020-04-28 NOTE — BRIEF OP NOTE
Post Cath Note  Referring Physician: Charity Hilliard MD  Procedure: Thrombolysis, PA (Bilateral)    Intervention:   Access: Right IJ    PA angiogram with placement of 4Fr pigtail catheter in the main PA  PA peak pressure 50 mmHg, mean PA pressure 27 mmHg    Initiation of catheter-directed thrombolysis through 4Fr pigtail catheter    IV alteplase 5mg bolus administered in the cath lab    See full report for further details    Post Cath Exam:   BP (!) 134/95 (BP Location: Left arm, Patient Position: Lying)   Pulse 95   Temp 98 °F (36.7 °C) (Oral)   Resp 18   Ht 6' (1.829 m)   Wt 118.8 kg (261 lb 14.5 oz)   SpO2 97%   BMI 35.52 kg/m²   No unusual pain, hematoma, thrill or bruit at vascular access site.  Distal pulse present without signs of ischemia.    Recommendations:   - Routine post-cath care  - Alteplase infusion @ 1 mg/hr for 15 hours  - Check PA pressures Q4H  - Follow fibrinogen Q6H  - Patient on heparin gtt, goal PTT 45-60    Signed:  Conor Montana MD  Interventional Cardiology Fellow, PGY-7  4/27/2020 11:02 PM

## 2020-04-28 NOTE — ASSESSMENT & PLAN NOTE
- Normotensive holding his blood pressure given acute intervention with catheter directed thrombolysis

## 2020-04-29 PROBLEM — I65.23 CAROTID STENOSIS, BILATERAL: Status: ACTIVE | Noted: 2020-04-29

## 2020-04-29 LAB
ALBUMIN SERPL BCP-MCNC: 3.3 G/DL (ref 3.5–5.2)
ALP SERPL-CCNC: 82 U/L (ref 55–135)
ALT SERPL W/O P-5'-P-CCNC: 17 U/L (ref 10–44)
ANION GAP SERPL CALC-SCNC: 10 MMOL/L (ref 8–16)
AST SERPL-CCNC: 17 U/L (ref 10–40)
BASOPHILS # BLD AUTO: 0.02 K/UL (ref 0–0.2)
BASOPHILS NFR BLD: 0.3 % (ref 0–1.9)
BILIRUB SERPL-MCNC: 0.6 MG/DL (ref 0.1–1)
BUN SERPL-MCNC: 8 MG/DL (ref 6–20)
CALCIUM SERPL-MCNC: 9.2 MG/DL (ref 8.7–10.5)
CHLORIDE SERPL-SCNC: 101 MMOL/L (ref 95–110)
CO2 SERPL-SCNC: 26 MMOL/L (ref 23–29)
CREAT SERPL-MCNC: 1 MG/DL (ref 0.5–1.4)
DIFFERENTIAL METHOD: ABNORMAL
EOSINOPHIL # BLD AUTO: 0.2 K/UL (ref 0–0.5)
EOSINOPHIL NFR BLD: 2.7 % (ref 0–8)
ERYTHROCYTE [DISTWIDTH] IN BLOOD BY AUTOMATED COUNT: 14.7 % (ref 11.5–14.5)
EST. GFR  (AFRICAN AMERICAN): >60 ML/MIN/1.73 M^2
EST. GFR  (NON AFRICAN AMERICAN): >60 ML/MIN/1.73 M^2
FIBRINOGEN PPP-MCNC: 568 MG/DL (ref 182–366)
FIBRINOGEN PPP-MCNC: 591 MG/DL (ref 182–366)
FIBRINOGEN PPP-MCNC: 616 MG/DL (ref 182–366)
GLUCOSE SERPL-MCNC: 121 MG/DL (ref 70–110)
HCT VFR BLD AUTO: 42.8 % (ref 40–54)
HGB BLD-MCNC: 14.1 G/DL (ref 14–18)
IMM GRANULOCYTES # BLD AUTO: 0.04 K/UL (ref 0–0.04)
IMM GRANULOCYTES NFR BLD AUTO: 0.6 % (ref 0–0.5)
LYMPHOCYTES # BLD AUTO: 1.5 K/UL (ref 1–4.8)
LYMPHOCYTES NFR BLD: 20.9 % (ref 18–48)
MAGNESIUM SERPL-MCNC: 1.7 MG/DL (ref 1.6–2.6)
MCH RBC QN AUTO: 28.2 PG (ref 27–31)
MCHC RBC AUTO-ENTMCNC: 32.9 G/DL (ref 32–36)
MCV RBC AUTO: 86 FL (ref 82–98)
MONOCYTES # BLD AUTO: 0.8 K/UL (ref 0.3–1)
MONOCYTES NFR BLD: 11.4 % (ref 4–15)
NEUTROPHILS # BLD AUTO: 4.5 K/UL (ref 1.8–7.7)
NEUTROPHILS NFR BLD: 64.1 % (ref 38–73)
NRBC BLD-RTO: 0 /100 WBC
PHOSPHATE SERPL-MCNC: 2.8 MG/DL (ref 2.7–4.5)
PLATELET # BLD AUTO: 181 K/UL (ref 150–350)
PMV BLD AUTO: 11.5 FL (ref 9.2–12.9)
POCT GLUCOSE: 104 MG/DL (ref 70–110)
POCT GLUCOSE: 106 MG/DL (ref 70–110)
POCT GLUCOSE: 115 MG/DL (ref 70–110)
POCT GLUCOSE: 118 MG/DL (ref 70–110)
POCT GLUCOSE: 137 MG/DL (ref 70–110)
POTASSIUM SERPL-SCNC: 3.3 MMOL/L (ref 3.5–5.1)
PROT SERPL-MCNC: 6.9 G/DL (ref 6–8.4)
RBC # BLD AUTO: 5 M/UL (ref 4.6–6.2)
SODIUM SERPL-SCNC: 137 MMOL/L (ref 136–145)
WBC # BLD AUTO: 6.99 K/UL (ref 3.9–12.7)

## 2020-04-29 PROCEDURE — 25000003 PHARM REV CODE 250: Performed by: STUDENT IN AN ORGANIZED HEALTH CARE EDUCATION/TRAINING PROGRAM

## 2020-04-29 PROCEDURE — 80053 COMPREHEN METABOLIC PANEL: CPT

## 2020-04-29 PROCEDURE — 20000000 HC ICU ROOM

## 2020-04-29 PROCEDURE — 63600175 PHARM REV CODE 636 W HCPCS: Performed by: STUDENT IN AN ORGANIZED HEALTH CARE EDUCATION/TRAINING PROGRAM

## 2020-04-29 PROCEDURE — 83735 ASSAY OF MAGNESIUM: CPT

## 2020-04-29 PROCEDURE — 93010 ELECTROCARDIOGRAM REPORT: CPT | Mod: ,,, | Performed by: INTERNAL MEDICINE

## 2020-04-29 PROCEDURE — 85384 FIBRINOGEN ACTIVITY: CPT | Mod: 91

## 2020-04-29 PROCEDURE — 94761 N-INVAS EAR/PLS OXIMETRY MLT: CPT

## 2020-04-29 PROCEDURE — 84100 ASSAY OF PHOSPHORUS: CPT

## 2020-04-29 PROCEDURE — 85025 COMPLETE CBC W/AUTO DIFF WBC: CPT

## 2020-04-29 PROCEDURE — 85384 FIBRINOGEN ACTIVITY: CPT

## 2020-04-29 PROCEDURE — 93005 ELECTROCARDIOGRAM TRACING: CPT

## 2020-04-29 PROCEDURE — 93010 EKG 12-LEAD: ICD-10-PCS | Mod: ,,, | Performed by: INTERNAL MEDICINE

## 2020-04-29 PROCEDURE — 99223 1ST HOSP IP/OBS HIGH 75: CPT | Mod: ,,, | Performed by: PSYCHIATRY & NEUROLOGY

## 2020-04-29 PROCEDURE — 99231 PR SUBSEQUENT HOSPITAL CARE,LEVL I: ICD-10-PCS | Mod: ,,, | Performed by: INTERNAL MEDICINE

## 2020-04-29 PROCEDURE — 99223 PR INITIAL HOSPITAL CARE,LEVL III: ICD-10-PCS | Mod: ,,, | Performed by: PSYCHIATRY & NEUROLOGY

## 2020-04-29 PROCEDURE — 99231 SBSQ HOSP IP/OBS SF/LOW 25: CPT | Mod: ,,, | Performed by: INTERNAL MEDICINE

## 2020-04-29 RX ORDER — POTASSIUM CHLORIDE 20 MEQ/15ML
40 SOLUTION ORAL
Status: DISCONTINUED | OUTPATIENT
Start: 2020-04-29 | End: 2020-04-30 | Stop reason: HOSPADM

## 2020-04-29 RX ORDER — NAPROXEN SODIUM 220 MG/1
81 TABLET, FILM COATED ORAL DAILY
Status: DISCONTINUED | OUTPATIENT
Start: 2020-04-29 | End: 2020-04-30 | Stop reason: HOSPADM

## 2020-04-29 RX ORDER — SIMETHICONE 80 MG
1 TABLET,CHEWABLE ORAL 3 TIMES DAILY PRN
Status: DISCONTINUED | OUTPATIENT
Start: 2020-04-29 | End: 2020-04-30 | Stop reason: HOSPADM

## 2020-04-29 RX ORDER — DIPHENHYDRAMINE HCL 25 MG
25 CAPSULE ORAL EVERY 6 HOURS PRN
Status: DISCONTINUED | OUTPATIENT
Start: 2020-04-29 | End: 2020-04-30 | Stop reason: HOSPADM

## 2020-04-29 RX ORDER — LANOLIN ALCOHOL/MO/W.PET/CERES
800 CREAM (GRAM) TOPICAL
Status: DISCONTINUED | OUTPATIENT
Start: 2020-04-29 | End: 2020-04-30 | Stop reason: HOSPADM

## 2020-04-29 RX ORDER — SODIUM,POTASSIUM PHOSPHATES 280-250MG
2 POWDER IN PACKET (EA) ORAL
Status: DISCONTINUED | OUTPATIENT
Start: 2020-04-29 | End: 2020-04-30 | Stop reason: HOSPADM

## 2020-04-29 RX ORDER — POTASSIUM CHLORIDE 20 MEQ/1
40 TABLET, EXTENDED RELEASE ORAL ONCE
Status: COMPLETED | OUTPATIENT
Start: 2020-04-29 | End: 2020-04-29

## 2020-04-29 RX ORDER — POTASSIUM CHLORIDE 20 MEQ/15ML
60 SOLUTION ORAL
Status: DISCONTINUED | OUTPATIENT
Start: 2020-04-29 | End: 2020-04-30 | Stop reason: HOSPADM

## 2020-04-29 RX ORDER — ACETAMINOPHEN 325 MG/1
650 TABLET ORAL ONCE
Status: COMPLETED | OUTPATIENT
Start: 2020-04-29 | End: 2020-04-29

## 2020-04-29 RX ORDER — MAGNESIUM SULFATE HEPTAHYDRATE 40 MG/ML
2 INJECTION, SOLUTION INTRAVENOUS ONCE
Status: COMPLETED | OUTPATIENT
Start: 2020-04-29 | End: 2020-04-29

## 2020-04-29 RX ORDER — POTASSIUM CHLORIDE 20 MEQ/1
20 TABLET, EXTENDED RELEASE ORAL ONCE
Status: COMPLETED | OUTPATIENT
Start: 2020-04-29 | End: 2020-04-29

## 2020-04-29 RX ORDER — NAPROXEN SODIUM 220 MG/1
81 TABLET, FILM COATED ORAL DAILY
Qty: 90 TABLET | Refills: 3 | Status: SHIPPED | OUTPATIENT
Start: 2020-04-29 | End: 2021-04-29

## 2020-04-29 RX ADMIN — SENNOSIDES AND DOCUSATE SODIUM 1 TABLET: 8.6; 5 TABLET ORAL at 09:04

## 2020-04-29 RX ADMIN — ASPIRIN 81 MG CHEWABLE TABLET 81 MG: 81 TABLET CHEWABLE at 01:04

## 2020-04-29 RX ADMIN — PANTOPRAZOLE SODIUM 40 MG: 40 TABLET, DELAYED RELEASE ORAL at 09:04

## 2020-04-29 RX ADMIN — MAGNESIUM SULFATE 2 G: 2 INJECTION INTRAVENOUS at 05:04

## 2020-04-29 RX ADMIN — DIPHENHYDRAMINE HYDROCHLORIDE 25 MG: 25 CAPSULE ORAL at 09:04

## 2020-04-29 RX ADMIN — APIXABAN 5 MG: 5 TABLET, FILM COATED ORAL at 09:04

## 2020-04-29 RX ADMIN — SIMETHICONE CHEW TAB 80 MG 80 MG: 80 TABLET ORAL at 11:04

## 2020-04-29 RX ADMIN — LISINOPRIL AND HYDROCHLOROTHIAZIDE 1 TABLET: 12.5; 1 TABLET ORAL at 09:04

## 2020-04-29 RX ADMIN — APIXABAN 10 MG: 5 TABLET, FILM COATED ORAL at 08:04

## 2020-04-29 RX ADMIN — HYDROCODONE BITARTRATE AND ACETAMINOPHEN 1 TABLET: 5; 325 TABLET ORAL at 11:04

## 2020-04-29 RX ADMIN — POTASSIUM CHLORIDE 40 MEQ: 1500 TABLET, EXTENDED RELEASE ORAL at 05:04

## 2020-04-29 RX ADMIN — APIXABAN 5 MG: 5 TABLET, FILM COATED ORAL at 11:04

## 2020-04-29 RX ADMIN — SODIUM CHLORIDE 250 ML: 0.9 INJECTION, SOLUTION INTRAVENOUS at 04:04

## 2020-04-29 RX ADMIN — DIPHENHYDRAMINE HYDROCHLORIDE 25 MG: 25 CAPSULE ORAL at 08:04

## 2020-04-29 RX ADMIN — PRAVASTATIN SODIUM 20 MG: 20 TABLET ORAL at 09:04

## 2020-04-29 RX ADMIN — ACETAMINOPHEN 650 MG: 325 TABLET ORAL at 04:04

## 2020-04-29 RX ADMIN — POTASSIUM CHLORIDE 20 MEQ: 1500 TABLET, EXTENDED RELEASE ORAL at 07:04

## 2020-04-29 NOTE — CARE UPDATE
Care Update     Mr. Willy Delgado has recurrent headache for started 4/28 afternoon. Overnight, his headache worsen and not improved with medication. Underwent CT head which revealed no ICH, however it showed Small hypodensity in the splenium of the corpus callosum.     Consulted vascular neurology, eventually underwent MRI/MRA brain and showed No parenchymal mass, hemorrhage, edema or recent or remote major vascular distribution infarct, (signal hyperintensity ? early chronic microvascular ischemic disease). His headache stable, neurology exam unchanged, resumed anticoagulation and continue current treatment.     Herbie Gonzales MD  Cardiology Fellow (PGY-IV)  Pager: [phone]

## 2020-04-29 NOTE — PLAN OF CARE
SW is following this Pt for DC planning needs. There are no identified needs at this time.    SW can be reached to assist with arranging transport if needed for patient at discharge. If after-hours, please reach out to the on-call SW for assistance.     Rosalva Ocasio LMSW   - Case Management

## 2020-04-29 NOTE — PROGRESS NOTES
[facility]  Cardiology  Progress Note    Patient Name: Willy Delgado  MRN: [id number]  Admission Date: 4/27/2020  Hospital Length of Stay: 2 days  Code Status: Full Code   Attending Physician: Charity Hilliard MD   Primary Care Physician: Addy Workman MD  Expected Discharge Date: 5/2/2020  Principal Problem:Bilateral pulmonary embolism    Subjective:     Hospital Course:   Mr. Delgado is a 60 y/o M presenting with a submassive pulmonary embolus for which he underwent catheter directed thrombolysis on 4/27. PESI score 110. Patient had tPA discontinued after 15 hours with Heparin continuation in plans to start patient on life long therapy of Eliquis started on 4/29    Interval History: Unrelenting headache overnight. Imaged with no acute findings. Of note, 60-70% R ICA stenosis present. Left side sharp pain this morning near heart. EKG with no acute findings and described as atypical.      Objective:     Vital Signs (Most Recent):  Temp: 98.1 °F (36.7 °C) (04/29/20 0700)  Pulse: 80 (04/29/20 1030)  Resp: 12 (04/29/20 1030)  BP: 114/81 (04/29/20 1030)  SpO2: 98 % (04/29/20 1030) Vital Signs (24h Range):  Temp:  [97.6 °F (36.4 °C)-98.6 °F (37 °C)] 98.1 °F (36.7 °C)  Pulse:  [72-96] 80  Resp:  [9-44] 12  SpO2:  [93 %-99 %] 98 %  BP: (107-151)/() 114/81     Weight: 122.4 kg (269 lb 13.5 oz)  Body mass index is 36.6 kg/m².     SpO2: 98 %  O2 Device (Oxygen Therapy): room air      Intake/Output Summary (Last 24 hours) at 4/29/2020 1159  Last data filed at 4/29/2020 0700  Gross per 24 hour   Intake 1478 ml   Output 1800 ml   Net -322 ml       Lines/Drains/Airways     Drain                 Sheath 04/27/20 2300 Right 1 day          Peripheral Intravenous Line                 Peripheral IV - Single Lumen 04/27/20 1127 18 G Right Forearm 2 days         Peripheral IV - Single Lumen 04/27/20 1745 18 G Left Forearm 1 day                Physical Exam  Vital signs reviewed  Constitutional: Oriented to  person, place, and time. Appears  well-developed and well-nourished.   HENT:   Head: Normocephalic and atraumatic.   Eyes: EOM are normal.   Neck: Normal range of motion. No JVD present.   Cardiovascular: Normal rate, regular rhythm, normal heart sounds and intact distal pulses.   Exam reveals no gallop and no friction rub.   No murmur heard.  Pulmonary/Chest: Effort normal and breath sounds normal. Left base rales present. No chest wall tenderness  Abdominal: Soft. Bowel sounds are normal. There is no tenderness. There is no guarding.   Musculoskeletal: There is no edema present   Skin: Skin is warm and dry.       Significant Labs: All pertinent lab results from the last 24 hours have been reviewed.    Significant Imaging: reviewed    Assessment and Plan:       * Bilateral pulmonary embolism  Presentation with most likely provoked PE after his long car trip ~ 18 hours in two days. CTA showed extensive filling defects compatible with thromboemboli in the pulmonary arteries bilaterally.  Pulmonary Embolism Severity Index (PESI) is 110 (hypoxia and tachycardia)  - Bedside Echo showed RV strain with RV/LV diameter ratio ~ 1 with moderate RV enlargement     Repeat TTE: Normal left ventricular systolic function. The estimated ejection fraction is 55%. Normal LV diastolic function.  Mild right ventricular enlargement. Moderately to severely reduced right ventricular systolic function.    PLAN:  - Discontinued tPA after 15 hours with IJ removed  - Started on Eliquis at 10 mg BID for 7 days and then 5 mg BID for life given this is recurrent event     Carotid stenosis, bilateral  MRA of neck overnight for stroke work-up incidentally found carotid stenosis of 60-70% on the right ICA and 30% on the left ICA.    - Discharge on statin and ASA 81  - f/u in cardiology clinic in 4-6 weeks with carotid U/S prior to appointment    CKD (chronic kidney disease), stage III  - Baseline Creatinine is 1.3, and current stable sCr      PLAN:  - stable     Osteoarthritis of spine with radiculopathy, cervical region  Patient has had recent shoulder surgery.    PLAN:  - Continue Norco Q6H PRN     Hyperlipidemia  PLAN  - Continue statin therapy     Hypertension  PLAN:  - continue on home lisinopril- HCTZ         VTE Risk Mitigation (From admission, onward)         Ordered     apixaban tablet 10 mg  2 times daily      04/29/20 0946     IP VTE HIGH RISK PATIENT  Once      04/27/20 2003     Place sequential compression device  Until discontinued      04/27/20 2003                Bess Dalton MD  Cardiology  [facility]

## 2020-04-29 NOTE — ASSESSMENT & PLAN NOTE
MRA of neck overnight for stroke work-up incidentally found carotid stenosis of 60-70% on the right ICA and 30% on the left ICA.    - Discharge on statin and ASA 81  - f/u in cardiology clinic in 4-6 weeks with carotid U/S prior to appointment

## 2020-04-29 NOTE — PROGRESS NOTES
1300 - Pt ambulated to bathroom to have BM, urine occurrence, and wash off with cleansing wipes. Connected to portable monitor. HR noted to sustain in 130s while standing and pt states that he feels lightheaded. Once pt sat back down in chair, HR decreased to high 90s-low 100s. Dr. Dalton, cards resident notified. Discharge orders remain in place but verbal order received to monitor pt for next few hours. WCTM.

## 2020-04-29 NOTE — PLAN OF CARE
STAT head CT and MRI this shift after worsening dull h/a. AAOx4. Afebrile. MAP > 65. HR: 70-80s, NSR.  RA, SpO2 > 90%  UO: unmeasureable amount in toilet x1  Tolerates cardiac diet. BG monitored.  Plan of care reviewed with pt. All questions and concerns addressed. All VSS at this time. WCTM.     Skin note: foam applied to sacrum and heels

## 2020-04-29 NOTE — CONSULTS
Patient's chart was reviewed by a stroke team provider and will discuss with staff. If the patient has an acute neurological change and/or you believe the patient needs to be examined by a stroke provider, please contact [phone] to discuss the patient with a team member.    This patient had an acute headache that began on the afternoon of 4/29/2020 and worsened overnight with no other neurologic symptoms or assessment findings.  The primary team ordered a CTH that revealed findings concerning for hypodensity in the splenium of the corpus callosum.  Subsequent MRI Brain was negative for acute stroke but the MRA neck did reveal findings concerning for bilateral carotid stenosis, R>L.      Bilateral Carotid stenosis      Antithrombotics for secondary stroke prevention: Anticoagulants: continue Apixaban 5 mg BID    Statins for secondary stroke prevention and hyperlipidemia, if present:   Statins: Consider switching pravastatin to Atorvastatin 40 mg if not contraindicated    Aggressive risk factor modification: HTN, HLD, bilateral PE     Rehab efforts: The patient has been evaluated by a stroke team provider and the therapy needs have been fully considered based off the presenting complaints and exam findings. The following therapy evaluations are needed: None    Diagnostics ordered/pending: Carotid ultrasound to assess vasculature(VAS carotid US), could be obtained on an outpatient basis if the patient is asymptomatic and ready for discharge.    VTE prophylaxis: None: Reason for No Pharmacological VTE Prophylaxis: Currently on anticoagulation    BP parameters: carotid stenosis: No restriction if asymptomatic       Will sign off.  Please contact stroke team for any questions or concerns.     Marilyn Blackman DNP  [facility]  Department of Vascular Neurology   [facility]  [phone]

## 2020-04-29 NOTE — PROGRESS NOTES
1550: HR elevated due to standing. Attempted standing EKG; attempt unsuccessful. MD aware. 250cc NS bolus ordered. WCTM.

## 2020-04-29 NOTE — ASSESSMENT & PLAN NOTE
Presentation with most likely provoked PE after his long car trip ~ 18 hours in two days. CTA showed extensive filling defects compatible with thromboemboli in the pulmonary arteries bilaterally.  Pulmonary Embolism Severity Index (PESI) is 110 (hypoxia and tachycardia)  - Bedside Echo showed RV strain with RV/LV diameter ratio ~ 1 with moderate RV enlargement     Repeat TTE: Normal left ventricular systolic function. The estimated ejection fraction is 55%. Normal LV diastolic function.  Mild right ventricular enlargement. Moderately to severely reduced right ventricular systolic function.    PLAN:  - Discontinued tPA after 15 hours with IJ removed  - Started on Eliquis at 10 mg BID for 7 days and then 5 mg BID for life given this is recurrent event

## 2020-04-29 NOTE — SUBJECTIVE & OBJECTIVE
Interval History: Unrelenting headache overnight. Imaged with no acute findings. Of note, 60-70% R ICA stenosis present. Left side sharp pain this morning near heart. EKG with no acute findings and described as atypical.      Objective:     Vital Signs (Most Recent):  Temp: 98.1 °F (36.7 °C) (04/29/20 0700)  Pulse: 80 (04/29/20 1030)  Resp: 12 (04/29/20 1030)  BP: 114/81 (04/29/20 1030)  SpO2: 98 % (04/29/20 1030) Vital Signs (24h Range):  Temp:  [97.6 °F (36.4 °C)-98.6 °F (37 °C)] 98.1 °F (36.7 °C)  Pulse:  [72-96] 80  Resp:  [9-44] 12  SpO2:  [93 %-99 %] 98 %  BP: (107-151)/() 114/81     Weight: 122.4 kg (269 lb 13.5 oz)  Body mass index is 36.6 kg/m².     SpO2: 98 %  O2 Device (Oxygen Therapy): room air      Intake/Output Summary (Last 24 hours) at 4/29/2020 1159  Last data filed at 4/29/2020 0700  Gross per 24 hour   Intake 1478 ml   Output 1800 ml   Net -322 ml       Lines/Drains/Airways     Drain                 Sheath 04/27/20 2300 Right 1 day          Peripheral Intravenous Line                 Peripheral IV - Single Lumen 04/27/20 1127 18 G Right Forearm 2 days         Peripheral IV - Single Lumen 04/27/20 1745 18 G Left Forearm 1 day                Physical Exam  Vital signs reviewed  Constitutional: Oriented to person, place, and time. Appears  well-developed and well-nourished.   HENT:   Head: Normocephalic and atraumatic.   Eyes: EOM are normal.   Neck: Normal range of motion. No JVD present.   Cardiovascular: Normal rate, regular rhythm, normal heart sounds and intact distal pulses.   Exam reveals no gallop and no friction rub.   No murmur heard.  Pulmonary/Chest: Effort normal and breath sounds normal. No wheeze or rales present. No chest wall tenderness  Abdominal: Soft. Bowel sounds are normal. There is no tenderness. There is no guarding.   Musculoskeletal: There is no edema present   Skin: Skin is warm and dry.       Significant Labs: All pertinent lab results from the last 24 hours have  been reviewed.    Significant Imaging: reviewed

## 2020-04-29 NOTE — PLAN OF CARE
VSS currently. HR does become tachy 110s-130s when pt stands, unable to capture standing EKG. 250cc NS bolus given for HR. Pt will most likely stay in ICU another night and discharge home tomorrow. Pt remains A&Ox4. Moves well with standby assist. No gtts. Voids per urinal. Pt ahd 1 BM and 1 unmeasured urine occurrence today during ambulating to bathroom. Intermittent headache relieved by tylenol. Right side of neck where catheter was is swollen and tender, MD aware. Cardiac diet in place, tolerating well. POC reviewed with pt and children via phone. WCTM.       Problem: Adult Inpatient Plan of Care  Goal: Patient-Specific Goal (Individualization)  Description    Hx: HTN, HLD, PE 2002, R rotator cuff surgery (1/24)  Dx: b/l PE    4/25: 16 hour car ride  4/27: presented to [facility] ED after experiencing worsening SOB/chest pain while walking; COVID neg, elevated D-dimer, CT chest showed extensive PE, Aspirin & Lovenox given    Transferred to SICU for higher level of care; bedside echo done; cath lab for insertion of R IJ catheter directed thrombolysis (heparin/tpa/NS gtt)    4/28: catheter removed, bedside echo done (EF 55%), US b/l legs showed no DVTs; STAT head CT & MRI for worsening dull h/a  4/29: plans to discharge home disrupted by HR tachy while standing, 250cc NS bolus    Nursing:  MAP > 65  O2 > 90%  Accuchecks Q6h    Outcome: Ongoing, Progressing

## 2020-04-29 NOTE — DISCHARGE SUMMARY
[facility]  Cardiology  Discharge Summary      Patient Name: Willy Delgado  MRN: [id number]  Admission Date: 4/27/2020  Hospital Length of Stay: 2 days  Discharge Date and Time:  04/29/2020 12:32 PM  Attending Physician: Charity Hilliard MD  Discharging Provider: Bess Dalton MD  Primary Care Physician: Addy Workman MD    HPI:Mr. Willy Delgado, 59 y.o. male with prior history of Hypertension, Hyperlipidemia, and prior PE in 2002 was provoked by knee surgery and finished coumadin 6 months and stopped. He was at his baseline health until his surgery of his Right shoulder surgery for Rotator cuff impingement syndrome on 1/24 he has been taking oxycodon for pain. He became less active than before. Last Friday, 4/25 He had travelled to [place], [place] last week by car 8 hour followed by return to [place], [place] in the next day. Today he noted a worsening episode of shortness of breath when he ambulate himself to his car and it was associated with left sided chest pain described as pleuritic worsen with deep breath. He denies prior chest pain like this before, he denies palpitation, loss of consciousness or change in his medications. On arrival he underwent CTA and showed extensive PE. Admitted to cardiology for submassive PE.      Procedure(s) (LRB):  Thrombolysis, PA (Bilateral)  Angiogram Pulmonary Bilateral Selective  INSERTION, CATHETER, RIGHT HEART (Right)     Indwelling Lines/Drains at time of discharge:  Lines/Drains/Airways     Drain                 Sheath 04/27/20 2300 Right 1 day                Hospital Course Upon arrival, PERT team was activated. Patient was taken to cath lab for insertion of catheter for catheter directed tPA. Tolerated for 15hrs along with heparin. Monitored overnight and had persistent headache concerning for possible stroke. Work-up initiated and incidental finding of right side ICA stenosis of 60-70%, left side roughly 30% per MRA. No stroke found and denies  acute neurological changes. Plan to follow up with cardiology 4-6 weeks along with carotid duplex U/S as he may need vascular intervention.         Consults:   Consults (From admission, onward)        Status Ordering Provider     Inpatient consult to Vascular (Stroke) Neurology  Once     Provider:  (Not yet assigned)    CL Almeida          Significant Diagnostic Studies: Labs:   BMP:   Recent Labs   Lab 04/28/20  0400 04/29/20  0330    121*    137   K 4.1 3.3*    101   CO2 25 26   BUN 9 8   CREATININE 0.9 1.0   CALCIUM 9.3 9.2   MG 1.8 1.7       Pending Diagnostic Studies:     Procedure Component Value Units Date/Time    Fibrinogen [[id number]] Collected:  04/29/20 1200    Order Status:  Sent Lab Status:  No result     Specimen:  Blood           Final Active Diagnoses:    Diagnosis Date Noted POA    PRINCIPAL PROBLEM:  Bilateral pulmonary embolism [I26.99] 04/27/2020 Yes    Carotid stenosis, bilateral [I65.23] 04/29/2020 Yes    CKD (chronic kidney disease), stage III [N18.3] 04/27/2020 Yes    Osteoarthritis of spine with radiculopathy, cervical region [M47.22] 05/03/2019 Yes    Hypertension [I10] 01/26/2018 Yes    Hyperlipidemia [E78.5] 01/26/2018 Yes      Problems Resolved During this Admission:       Discharged Condition: good    Follow Up: Cardiology 4-6 weeks with carotid ultrasound prior to appointment for findings of carotid stenosis.    Patient Instructions:      Radiology US Carotid Bilateral   Standing Status: Future Standing Exp. Date: 04/29/21     Diet Cardiac     Notify your health care provider if you experience any of the following:  severe uncontrolled pain     Notify your health care provider if you experience any of the following:  difficulty breathing or increased cough     Notify your health care provider if you experience any of the following:  persistent dizziness, light-headedness, or visual disturbances     Notify your health care provider if you  experience any of the following:  increased confusion or weakness     Activity as tolerated     Medications:  Reconciled Home Medications:      Medication List      START taking these medications    * apixaban 5 mg (74 tabs) Dspk  Commonly known as:  ELIQUIS  For the first 7 days take two 5 mg tablets twice daily.  After 7 days take one 5 mg tablet twice daily. (1st script)     * apixaban 5 mg Tab  Commonly known as:  ELIQUIS  Take 1 tablet (5 mg total) by mouth 2 (two) times daily. Start AFTER Eliquis (apixaban) starter pack.  Start taking on:  May 28, 2020     aspirin 81 MG Chew  Chew and swallow 1 tablet (81 mg total) by mouth once daily.         * This list has 2 medication(s) that are the same as other medications prescribed for you. Read the directions carefully, and ask your doctor or other care provider to review them with you.            CHANGE how you take these medications    HYDROcodone-acetaminophen 5-325 mg per tablet  Commonly known as:  NORCO  Take 1-2 tablets by mouth every 8 (eight) hours as needed for Pain.  What changed:  Another medication with the same name was removed. Continue taking this medication, and follow the directions you see here.        CONTINUE taking these medications    lisinopriL-hydrochlorothiazide 20-25 mg Tab  Commonly known as:  PRINZIDE,ZESTORETIC  TAKE 1 TABLET BY MOUTH ONCE DAILY     omeprazole 40 MG capsule  Commonly known as:  PRILOSEC  Take 1 capsule (40 mg total) by mouth once daily.     pravastatin 20 MG tablet  Commonly known as:  PRAVACHOL  TAKE 1 TABLET BY MOUTH ONCE DAILY     traZODone 50 MG tablet  Commonly known as:  DESYREL  Take 1 tablet (50 mg total) by mouth every evening.            Time spent on the discharge of patient: 30 minutes    Bess Dalton MD  Cardiology  [facility]

## 2020-04-30 ENCOUNTER — NURSE TRIAGE (OUTPATIENT)
Dept: ADMINISTRATIVE | Facility: CLINIC | Age: 60
End: 2020-04-30

## 2020-04-30 VITALS
WEIGHT: 269.81 LBS | BODY MASS INDEX: 36.54 KG/M2 | TEMPERATURE: 98 F | HEART RATE: 87 BPM | OXYGEN SATURATION: 96 % | RESPIRATION RATE: 10 BRPM | HEIGHT: 72 IN | DIASTOLIC BLOOD PRESSURE: 92 MMHG | SYSTOLIC BLOOD PRESSURE: 118 MMHG

## 2020-04-30 LAB
ALBUMIN SERPL BCP-MCNC: 3.2 G/DL (ref 3.5–5.2)
ALP SERPL-CCNC: 75 U/L (ref 55–135)
ALT SERPL W/O P-5'-P-CCNC: 16 U/L (ref 10–44)
ANION GAP SERPL CALC-SCNC: 8 MMOL/L (ref 8–16)
AST SERPL-CCNC: 24 U/L (ref 10–40)
BILIRUB SERPL-MCNC: 0.5 MG/DL (ref 0.1–1)
BUN SERPL-MCNC: 7 MG/DL (ref 6–20)
CALCIUM SERPL-MCNC: 9 MG/DL (ref 8.7–10.5)
CHLORIDE SERPL-SCNC: 103 MMOL/L (ref 95–110)
CO2 SERPL-SCNC: 24 MMOL/L (ref 23–29)
CREAT SERPL-MCNC: 0.9 MG/DL (ref 0.5–1.4)
EST. GFR  (AFRICAN AMERICAN): >60 ML/MIN/1.73 M^2
EST. GFR  (NON AFRICAN AMERICAN): >60 ML/MIN/1.73 M^2
FIBRINOGEN PPP-MCNC: 567 MG/DL (ref 182–366)
FIBRINOGEN PPP-MCNC: 582 MG/DL (ref 182–366)
FIBRINOGEN PPP-MCNC: 615 MG/DL (ref 182–366)
GLUCOSE SERPL-MCNC: 110 MG/DL (ref 70–110)
MAGNESIUM SERPL-MCNC: 1.8 MG/DL (ref 1.6–2.6)
PHOSPHATE SERPL-MCNC: 2.9 MG/DL (ref 2.7–4.5)
POCT GLUCOSE: 112 MG/DL (ref 70–110)
POCT GLUCOSE: 112 MG/DL (ref 70–110)
POCT GLUCOSE: 121 MG/DL (ref 70–110)
POTASSIUM SERPL-SCNC: 4.3 MMOL/L (ref 3.5–5.1)
PROT SERPL-MCNC: 7 G/DL (ref 6–8.4)
SODIUM SERPL-SCNC: 135 MMOL/L (ref 136–145)

## 2020-04-30 PROCEDURE — 97161 PT EVAL LOW COMPLEX 20 MIN: CPT

## 2020-04-30 PROCEDURE — 25000003 PHARM REV CODE 250: Performed by: STUDENT IN AN ORGANIZED HEALTH CARE EDUCATION/TRAINING PROGRAM

## 2020-04-30 PROCEDURE — 84100 ASSAY OF PHOSPHORUS: CPT

## 2020-04-30 PROCEDURE — 83735 ASSAY OF MAGNESIUM: CPT

## 2020-04-30 PROCEDURE — 80053 COMPREHEN METABOLIC PANEL: CPT

## 2020-04-30 PROCEDURE — 85384 FIBRINOGEN ACTIVITY: CPT | Mod: 91

## 2020-04-30 PROCEDURE — 85384 FIBRINOGEN ACTIVITY: CPT

## 2020-04-30 PROCEDURE — 97165 OT EVAL LOW COMPLEX 30 MIN: CPT

## 2020-04-30 RX ORDER — IBUPROFEN 200 MG
24 TABLET ORAL
Status: DISCONTINUED | OUTPATIENT
Start: 2020-04-30 | End: 2020-04-30 | Stop reason: HOSPADM

## 2020-04-30 RX ORDER — GLUCAGON 1 MG
1 KIT INJECTION
Status: DISCONTINUED | OUTPATIENT
Start: 2020-04-30 | End: 2020-04-30 | Stop reason: HOSPADM

## 2020-04-30 RX ORDER — IBUPROFEN 200 MG
16 TABLET ORAL
Status: DISCONTINUED | OUTPATIENT
Start: 2020-04-30 | End: 2020-04-30 | Stop reason: HOSPADM

## 2020-04-30 RX ORDER — ATORVASTATIN CALCIUM 80 MG/1
80 TABLET, FILM COATED ORAL NIGHTLY
Qty: 90 TABLET | Refills: 3 | Status: SHIPPED | OUTPATIENT
Start: 2020-04-30 | End: 2021-04-30

## 2020-04-30 RX ORDER — ATORVASTATIN CALCIUM 20 MG/1
80 TABLET, FILM COATED ORAL NIGHTLY
Status: DISCONTINUED | OUTPATIENT
Start: 2020-04-30 | End: 2020-04-30 | Stop reason: HOSPADM

## 2020-04-30 RX ORDER — LISINOPRIL AND HYDROCHLOROTHIAZIDE 10; 12.5 MG/1; MG/1
1 TABLET ORAL DAILY
Qty: 90 TABLET | Refills: 3 | Status: SHIPPED | OUTPATIENT
Start: 2020-04-30 | End: 2021-04-30

## 2020-04-30 RX ADMIN — PRAVASTATIN SODIUM 20 MG: 20 TABLET ORAL at 09:04

## 2020-04-30 RX ADMIN — Medication 800 MG: at 05:04

## 2020-04-30 RX ADMIN — ASPIRIN 81 MG CHEWABLE TABLET 81 MG: 81 TABLET CHEWABLE at 09:04

## 2020-04-30 RX ADMIN — APIXABAN 10 MG: 5 TABLET, FILM COATED ORAL at 09:04

## 2020-04-30 RX ADMIN — PANTOPRAZOLE SODIUM 40 MG: 40 TABLET, DELAYED RELEASE ORAL at 09:04

## 2020-04-30 RX ADMIN — LISINOPRIL AND HYDROCHLOROTHIAZIDE 1 TABLET: 12.5; 1 TABLET ORAL at 09:04

## 2020-04-30 RX ADMIN — SENNOSIDES AND DOCUSATE SODIUM 1 TABLET: 8.6; 5 TABLET ORAL at 09:04

## 2020-04-30 RX ADMIN — HYDROCODONE BITARTRATE AND ACETAMINOPHEN 1 TABLET: 5; 325 TABLET ORAL at 09:04

## 2020-04-30 RX ADMIN — HYDROCODONE BITARTRATE AND ACETAMINOPHEN 1 TABLET: 5; 325 TABLET ORAL at 12:04

## 2020-04-30 NOTE — PT/OT/SLP EVAL
Occupational Therapy   Evaluation and Discharge Note    Name: Willy Delgado  MRN: [id number]  Admitting Diagnosis:  Bilateral pulmonary embolism 3 Days Post-Op    Recommendations:     Discharge Recommendations: home  Discharge Equipment Recommendations:  none  Barriers to discharge:  None    Assessment:     Willy Delgado is a 59 y.o. male with a medical diagnosis of Bilateral pulmonary embolism. At this time, patient is functioning at their prior level of function and does not require further acute OT services.     Plan:     During this hospitalization, patient does not require further acute OT services.  Please re-consult if situation changes.    · Plan of Care Reviewed with: patient    Subjective     Chief Complaint: No complaints   Patient/Family Comments/goals: return home.    Occupational Profile:  Living Environment: Pt will D/C home initially where he lives alone in a [facility] w/ no steps to enter. In 1 week, pt will D/C to [facility] in [place] which has no steps to enter as well.  Previous level of function: Indep  Roles and Routines: N/A  Equipment Used at home:  none  Assistance upon Discharge: Pt has assistance upon D/C.     Pain/Comfort:  · Pain Rating 1: 0/10  · Pain Rating Post-Intervention 1: 0/10    Patients cultural, spiritual, Druze conflicts given the current situation:      Objective:     Communicated with: RN prior to session.  Patient found up in chair with telemetry, pulse ox (continuous), blood pressure cuff upon OT entry to room.    General Precautions: Standard, fall   Orthopedic Precautions:N/A   Braces: N/A     Occupational Performance:    Functional Mobility/Transfers:  · Patient completed Sit <> Stand Transfer with independence  with  no assistive device   · Functional Mobility: Pt ambulated in the hallway and around the room indep.     Activities of Daily Living:  · Lower Body Dressing: supervision donned and doffed socks seated in bedside cahir    Cognitive/Visual  Perceptual:  Cognitive/Psychosocial Skills:     -       Oriented to: Person, Place, Time and Situation   -       Follows Commands/attention:Follows multistep  commands  -       Communication: clear/fluent  -       Memory: No Deficits noted  -       Safety awareness/insight to disability: impaired   -       Mood/Affect/Coping skills/emotional control: Appropriate to situation  Visual/Perceptual:      -Intact      Physical Exam:  Balance:    -       minor limp on R knee. Pt to have R TKA soon  Postural examination/scapula alignment:    -       Rounded shoulders  Skin integrity: Visible skin intact  Upper Extremity Range of Motion:     -       Right Upper Extremity: WFL  -       Left Upper Extremity: WFL  Upper Extremity Strength:    -       Right Upper Extremity: WFL  -       Left Upper Extremity: WFL   Strength:    -       Right Upper Extremity: WFL  -       Left Upper Extremity: WFL  Fine Motor Coordination:    -       Intact  Gross motor coordination:   WFL    AMPAC 6 Click ADL:  AMPAC Total Score: 24    Treatment & Education:  Pt educated on POC.   Education:    Patient left up in chair with all lines intact, call button in reach and RN present    GOALS:   Multidisciplinary Problems     Occupational Therapy Goals        Problem: Occupational Therapy Goal    Goal Priority Disciplines Outcome Interventions   Occupational Therapy Goal     OT, PT/OT Ongoing, Progressing    Description:  Pt is not currently displaying a need for acute OT services. D/C acute OT services and recommend pt D/C home.                    History:     Past Medical History:   Diagnosis Date    Bronchitis     Bulging of cervical intervertebral disc     Cervical radiculopathy     Cervical spondylosis     Chronic neck pain     Chronic sinusitis     DDD (degenerative disc disease), cervical     GERD (gastroesophageal reflux disease)     HLD (hyperlipidemia)     Hypertension     Osteoarthritis     Osteoarthritis of spine with  radiculopathy     Right rotator cuff tear     Wears dentures        Past Surgical History:   Procedure Laterality Date    ARTHROSCOPIC REPAIR OF ROTATOR CUFF OF SHOULDER Right 1/24/2020    Procedure: REPAIR, ROTATOR CUFF, ARTHROSCOPIC, Loaiza and Nephcriss, beach chair, 1st assist;  Surgeon: Aureliano Cárdenas MD;  Location: [facility];  Service: Orthopedics;  Laterality: Right;  FIRST ASSISTANT CONFIRMED 1/17/DME  LOAIZA & NEPHEW CONFIRMED 1/22/DME    ARTHROSCOPY OF SHOULDER WITH DECOMPRESSION OF SUBACROMIAL SPACE Right 01/24/2020    BILATERAL PULMONARY ANGIOGRAPHY  4/27/2020    Procedure: Angiogram Pulmonary Bilateral Selective;  Surgeon: Darwin Garland MD;  Location: [facility] CATH LAB;  Service: Cardiology;;    DECOMPRESSION OF SUBACROMIAL SPACE Right 1/24/2020    Procedure: DECOMPRESSION, SUBACROMIAL SPACE;  Surgeon: Aureliano Cárdenas MD;  Location: [facility];  Service: Orthopedics;  Laterality: Right;    KNEE SURGERY Right     RIGHT HEART CATHETERIZATION Right 4/27/2020    Procedure: INSERTION, CATHETER, RIGHT HEART;  Surgeon: Darwin Garland MD;  Location: [facility] CATH LAB;  Service: Cardiology;  Laterality: Right;       Time Tracking:     OT Date of Treatment: 04/30/20  OT Start Time: 1135  OT Stop Time: 1145  OT Total Time (min): 10 min    Billable Minutes:Evaluation 10 minutes    Ozzy Hall, OT  4/30/2020

## 2020-04-30 NOTE — ASSESSMENT & PLAN NOTE
PLAN  -- Continue statin therapy. Stopped pravastatin and changed to atorvastatin  -- ASCVD score = 8.5% of having a cardiovascular event annually

## 2020-04-30 NOTE — PLAN OF CARE
Willy Delgado is a 59 y.o. male admitted to [facility] on 4/27/2020 for Bilateral pulmonary embolism. Willy Delgado tolerated evaluation well today. He was sitting up in chair upon PT/OT entry to room, agreeable to participate. States he's feeling improved has been able to ambulate within room without difficulty. Ambulates ~170 ft independently without device today, has a slight limp with RLE weightbearing but this is baseline for patient (has planned R TKA coming up soon). HR as high as 102 bpm, pulse ox 93% lowest with ambulation today. States he feels well without fatigue, c/o 1/10 R knee pain. Plans to d/c home to live on his own for next 5 days before flying to [place] to stay with family. Discussed PT role, continued mobility and recommendations (Home; no DME needs) with patient; verbalized understanding. At this time, Wlily Delgado has no acute PT needs, will now d/c from acute PT services.    Problem: Physical Therapy Goal  Goal: Physical Therapy Goal  Description  Pt has no acute PT needs, thus no goals created.   Outcome: Met    Mansoor Barriga, PT  4/30/2020

## 2020-04-30 NOTE — PT/OT/SLP EVAL
Physical Therapy  Evaluation and Discharge    Willy Delgado   [id number]    Time Tracking:     PT Received On: 04/30/20   PT Start Time: 1135   PT Stop Time: 1145   PT Total Time (min): 10 min    Billable Minutes: Evaluation 1 procedure      Recommendations:     Discharge recommendations: Home     Equipment recommendations: None    Barriers to Discharge: None    Patient Information:     Recent Surgery: Procedure(s) (LRB):  Thrombolysis, PA (Bilateral)  Angiogram Pulmonary Bilateral Selective  INSERTION, CATHETER, RIGHT HEART (Right) 3 Days Post-Op    Diagnosis: Bilateral pulmonary embolism    Length of Stay: 3 days    General Precautions: Standard, fall  Orthopedic Precautions: None    Assessment:     Willy Delgado is a 59 y.o. male [place] [facility] [place] for Bilateral pulmonary embolism. Willy Delgado tolerated evaluation well today. He was sitting up in chair upon PT/OT entry to room, agreeable to participate. States he's feeling improved has been able to ambulate within room without difficulty. Ambulates ~170 ft independently without device today, has a slight limp with RLE weightbearing but this is baseline for patient (has planned R TKA coming up soon). HR as high as 102 bpm, pulse ox 93% lowest with ambulation today. States he feels well without fatigue, c/o 1/10 R knee pain. Plans to d/c home to live on his own for next 5 days before flying to [place] to stay with family. Discussed PT role, continued mobility and recommendations (Home; no DME needs) with patient; verbalized understanding. At this time, Willy Delgado has no acute PT needs, will now d/c from acute PT services.    Problem List: pain (R knee pain with gait, baseline pain, planned TKA upcoming)    Plan:     Discharge from acute PT services.    Plan of Care reviewed with: patient    Subjective:     Communicated with RN prior to evaluation, appropriate to see for evaluation.    Pt found sitting up in bedside chair upon PT entry to room,  agreeable to evaluation.    Does this patient have any cultural, spiritual, [employer] conflicts given the current situation? Patient has no barriers to learning. Patient verbalizes understanding of his/her program and goals and demonstrates them correctly. No cultural, spiritual, or educational needs identified.    Past Medical History:   Diagnosis Date    Bronchitis     Bulging of cervical intervertebral disc     Cervical radiculopathy     Cervical spondylosis     Chronic neck pain     Chronic sinusitis     DDD (degenerative disc disease), cervical     GERD (gastroesophageal reflux disease)     HLD (hyperlipidemia)     Hypertension     Osteoarthritis     Osteoarthritis of spine with radiculopathy     Right rotator cuff tear     Wears dentures      Past Surgical History:   Procedure Laterality Date    ARTHROSCOPIC REPAIR OF ROTATOR CUFF OF SHOULDER Right 1/24/2020    Procedure: REPAIR, ROTATOR CUFF, ARTHROSCOPIC, Loaiza and Nephew, beach chair, 1st assist;  Surgeon: Aureliano Cárdenas MD;  Location: [facility];  Service: Orthopedics;  Laterality: Right;  FIRST ASSISTANT CONFIRMED 1/17/DME  LOAIZA & NEPHEW CONFIRMED 1/22/DME    ARTHROSCOPY OF SHOULDER WITH DECOMPRESSION OF SUBACROMIAL SPACE Right 01/24/2020    BILATERAL PULMONARY ANGIOGRAPHY  4/27/2020    Procedure: Angiogram Pulmonary Bilateral Selective;  Surgeon: Darwin Garland MD;  Location: [facility] CATH LAB;  Service: Cardiology;;    DECOMPRESSION OF SUBACROMIAL SPACE Right 1/24/2020    Procedure: DECOMPRESSION, SUBACROMIAL SPACE;  Surgeon: Aureliano Cárdenas MD;  Location: [facility];  Service: Orthopedics;  Laterality: Right;    KNEE SURGERY Right     RIGHT HEART CATHETERIZATION Right 4/27/2020    Procedure: INSERTION, CATHETER, RIGHT HEART;  Surgeon: Darwin Garland MD;  Location: [facility] CATH LAB;  Service: Cardiology;  Laterality: Right;     Living Environment:  Pt lives alone in a 1  with 0 FRANCIA, uses a tub/shower combo without seat  available. Plans to fly to [place] to stay with family next week (on Tuesday), also a 1 SH with no stairs.    PLOF:  Prior to admission, patient was ambulatory without device for household distances. States he uses a single point cane for community distances. Independent for ADL's, typically takes baths in tub at home and has no difficulty getting up/down from tub.    DME:  Patient owns or has access to the following DME: Single Point Cane    Upon discharge, patient will have assistance from neighbor as needed.    Objective:     Patient found with: telemetry, pulse ox (continuous), blood pressure cuff    Pain:  Pain Rating 1: 0/10 at rest  Pain Rating Post-Intervention 1: 1/10 at R knee with gait    Cognitive Exam:  Patient is oriented to Person, Place, Time and Situation.  Patient follows 100% of single-step commands.    Sensation:   Intact    Lower Extremity Range of Motion:  Right Lower Extremity: WFL actively  Left Lower Extremity: WFL actively    Lower Extremity Strength:  Right Lower Extremity: WFL  Left Lower Extremity: WFL    Functional Mobility:    · Bed Mobility:  · NT; out of bed in chair before and after session    · Transfers:  · Sit to Stand: Independent from chair with no AD x 1 trial(s)    · Gait:  · 170 feet independently without device today, has a slight limp with RLE weightbearing but this is baseline for patient (has planned R TKA coming up soon). HR as high as 102 bpm, pulse ox 93% lowest with ambulation today. States he feels well without fatigue, c/o 1/10 R knee pain    · Assist level: Independent  · Device: no AD    · Balance:  · Static Sit: Independent at edge of chair    · Static Stand: Independent with no AD    Additional Therapeutic Activity/Exercises:     1. Discussed PT role, continued mobility, recommendations (Home), and plan for d/c from acute PT services with patient; verbalized understanding.    AM-PAC 6 CLICK MOBILITY  Turning over in bed (including adjusting bedclothes, sheets  and blankets)?: 4  Sitting down on and standing up from a chair with arms (e.g., wheelchair, bedside commode, etc.): 4  Moving from lying on back to sitting on the side of the bed?: 4  Moving to and from a bed to a chair (including a wheelchair)?: 4  Need to walk in hospital room?: 4  Climbing 3-5 steps with a railing?: 4  Basic Mobility Total Score: 24    Patient was left sitting up in bedside chair with all lines intact, call button in reach and RN present.    Clinical Decision Making for Evaluation Complexity:  1. Body System(s) Examination: 1-2  2. Clinical Presentation: Stable  3. Evaluation Complexity: Low    GOALS:   Multidisciplinary Problems     Physical Therapy Goals        Problem: Physical Therapy Goal    Goal Priority Disciplines Outcome Goal Variances Interventions   Physical Therapy Goal     PT, PT/OT      Description:  Pt has no acute PT needs, thus no goals created.                  Mansoor Barriga, PT  4/30/2020

## 2020-04-30 NOTE — DISCHARGE INSTRUCTIONS
Discharge instructions reviewed with pt and questions answered. MD spoke with pt's daughter, Lizzette, to answer any and all questions. Pt removed from telemetry monitor and all IVs removed. Pt to be discharged and picked up by sister.

## 2020-04-30 NOTE — PLAN OF CARE
No acute events during shift. AAOx4. Pt follows commands and moves upper/lower extremities bilaterally. VSS. HR remained 80-90s. Pt on room air, SpO2 maintained >95%. Pt voids per urinal UO 400cc/shift. Pt updated on plan of care. No new skin breakdown noted upon assessment. Dressings remain clean, dry, and intact. JESSICA hose applied upon request. SCD applied. Bed plugged in.

## 2020-04-30 NOTE — PLAN OF CARE
Problem: Occupational Therapy Goal  Goal: Occupational Therapy Goal  Description  Pt is not currently displaying a need for acute OT services. D/C acute OT services and recommend pt D/C home.   Outcome: Ongoing, Progressing    Ozzy Hall OTR/L  4/30/2020

## 2020-04-30 NOTE — DISCHARGE SUMMARY
[facility]  Cardiology  Discharge Summary      Patient Name: Willy Delgado  MRN: [id number]  Admission Date: 4/27/2020  Hospital Length of Stay: 3 days  Discharge Date and Time:  04/30/2020 4:14 PM  Attending Physician: Charity Hilliard MD    Discharging Provider: Desiree Woodward DO  Primary Care Physician: Addy Workman MD    HPI:   Mr. Willy Delgado, 59 y.o. male with prior history of Hypertension, Hyperlipidemia, and prior PE in 2002 was provoked by knee surgery and finished coumadin 6 months and stopped. He was at his baseline health until his surgery of his Right shoulder surgery for Rotator cuff impingement syndrome on 1/24 he has been taking oxycodon for pain. He became less active than before. Last Friday, 4/25 He had travelled to [place], [place] last week by car 8 hour followed by return to [place], [place] in the next day. Today he noted a worsening episode of shortness of breath when he ambulate himself to his car and it was associated with left sided chest pain described as pleuritic worsen with deep breath. He denies prior chest pain like this before, he denies palpitation, loss of consciousness or change in his medications. On arrival he underwent CTA and showed extensive PE. Admitted to cardiology for submassive PE.     Procedure(s) (LRB):  Thrombolysis, PA (Bilateral)  Angiogram Pulmonary Bilateral Selective  INSERTION, CATHETER, RIGHT HEART (Right)     Indwelling Lines/Drains at time of discharge:  Lines/Drains/Airways     Drain                 Sheath 04/27/20 2300 Right 2 days                Hospital Course:  Upon arrival, PERT team was activated. Patient was taken to cath lab for insertion of catheter for catheter directed tPA. Tolerated for 15hrs along with heparin. Monitored overnight and had persistent headache concerning for possible stroke. Work-up initiated and incidental finding of right side ICA stenosis of 60-70%, left side roughly 30% per MRA. No stroke found and  denies acute neurological changes. Plan to follow up with cardiology 4-6 weeks along with carotid duplex U/S as he may need vascular intervention.  Plan for life long therapy with eliquis as this is a second time with PE/ DVT. Family called. Questions sought and answered. Patient was discharged in stable condition for follow up with cardiology and his PCP     Consults:   Consults (From admission, onward)        Status Ordering Provider     Inpatient consult to Vascular (Stroke) Neurology  Once     Provider:  (Not yet assigned)    Completed CL POST        Physical Exam  Vital signs reviewed  Constitutional: Oriented to person, place, and time. Appears  well-developed and well-nourished.   HENT:   Head: Normocephalic and atraumatic.   Eyes: EOM are normal.   Neck: Normal range of motion. No JVD present.   Cardiovascular: Normal rate, regular rhythm, normal heart sounds and intact distal pulses.   Exam reveals no gallop and no friction rub.   No murmur heard.  Pulmonary/Chest: Effort normal and breath sounds normal. Left base rales present. No chest wall tenderness  Abdominal: Soft. Bowel sounds are normal. There is no tenderness. There is no guarding.   Musculoskeletal: There is no edema present   Skin: Skin is warm and dry.     Significant Diagnostic Studies: Labs:   CMP   Recent Labs   Lab 04/29/20  0330 04/30/20  0315    135*   K 3.3* 4.3    103   CO2 26 24   * 110   BUN 8 7   CREATININE 1.0 0.9   CALCIUM 9.2 9.0   PROT 6.9 7.0   ALBUMIN 3.3* 3.2*   BILITOT 0.6 0.5   ALKPHOS 82 75   AST 17 24   ALT 17 16   ANIONGAP 10 8   ESTGFRAFRICA >60.0 >60.0   EGFRNONAA >60.0 >60.0   , CBC   Recent Labs   Lab 04/29/20  0330   WBC 6.99   HGB 14.1   HCT 42.8      , INR   Lab Results   Component Value Date    INR 1.1 04/28/2020    INR 1.1 04/27/2020    INR 1.0 04/27/2020   , Lipid Panel   Lab Results   Component Value Date    CHOL 143 03/03/2020    HDL 41 03/03/2020    LDLCALC 77 03/03/2020     TRIG 127 03/03/2020    CHOLHDL 28.7 03/03/2020    and A1C: No results for input(s): HGBA1C in the last 4320 hours.    Pending Diagnostic Studies:     None          Final Active Diagnoses:    Diagnosis Date Noted POA    PRINCIPAL PROBLEM:  Bilateral pulmonary embolism [I26.99] 04/27/2020 Yes    Carotid stenosis, bilateral [I65.23] 04/29/2020 Yes    CKD (chronic kidney disease), stage III [N18.3] 04/27/2020 Yes    Osteoarthritis of spine with radiculopathy, cervical region [M47.22] 05/03/2019 Yes    Hypertension [I10] 01/26/2018 Yes    Hyperlipidemia [E78.5] 01/26/2018 Yes      Problems Resolved During this Admission:     * Bilateral pulmonary embolism  Presentation with most likely provoked PE after his long car trip ~ 18 hours in two days. CTA showed extensive filling defects compatible with thromboemboli in the pulmonary arteries bilaterally.  Pulmonary Embolism Severity Index (PESI) is 110 (hypoxia and tachycardia)  - Bedside Echo showed RV strain with RV/LV diameter ratio ~ 1 with moderate RV enlargement     Repeat TTE: Normal left ventricular systolic function. The estimated ejection fraction is 55%. Normal LV diastolic function.  Mild right ventricular enlargement. Moderately to severely reduced right ventricular systolic function.    PLAN:  - Discontinued tPA after 15 hours with IJ removed  - Started on Eliquis at 10 mg BID for 7 days and then 5 mg BID for life given this is recurrent event     Carotid stenosis, bilateral  MRA of neck overnight for stroke work-up incidentally found carotid stenosis of 60-70% on the right ICA and 30% on the left ICA.    PLAN:  - Discharge on statin and ASA 81  - f/u in cardiology clinic in 4-6 weeks with carotid U/S prior to appointment    CKD (chronic kidney disease), stage III  - Baseline Creatinine is 1.3, and current stable sCr     PLAN:  - stable     Osteoarthritis of spine with radiculopathy, cervical region  Patient has had recent shoulder surgery.    PLAN:  -  Continue Norco Q6H PRN     Hyperlipidemia  PLAN  -- Continue statin therapy. Stopped pravastatin and changed to atorvastatin  -- ASCVD score = 8.5% of having a cardiovascular event annually    Hypertension  PLAN:  - continue on home lisinopril- HCTZ         Discharged Condition: stable    Disposition: Home or Self Care    Follow Up:  Follow-up Information     [facility]. Go on 6/23/2020.    Specialty:  Emergency Medicine  Why:  hospital followup- cardiology  6/23/20@ 9:00AM  Contact information:  [address]  [facility] [id number] [phone]           [facility] On 6/23/2020.    Specialty:  Emergency Medicine  Why:  carotid ultrasound- 7:15AM  Contact information:  [address]  [facility] [id number] [phone]           Addy Workman MD In 1 week.    Specialty:  Family Medicine  Why:  hospital F/U  Contact information:  [address] [place] [address]  [phone]             [facility] - [facility] In 2 weeks.    Specialty:  Cardiology  Why:  F/U submassive PE + R ICA 60-70% stenosis  Contact information:  [address]  [facility] [id number] [phone]  Additional information:  3rd floor               Patient Instructions:      Radiology US Carotid Bilateral   Standing Status: Future Standing Exp. Date: 04/29/21     Ambulatory referral/consult to Cardiology   Standing Status: Future   Referral Priority: Routine Referral Type: Consultation   Referral Reason: Specialty Services Required   Requested Specialty: Cardiology   Number of Visits Requested: 1     Diet Cardiac     Diet Cardiac     Notify your health care provider if you experience any of the following:  severe uncontrolled pain     Notify your health care provider if you experience any of the following:  difficulty breathing or increased cough     Notify your health care provider if you experience any of the following:  persistent dizziness,  light-headedness, or visual disturbances     Notify your health care provider if you experience any of the following:  increased confusion or weakness     Notify your health care provider if you experience any of the following:  temperature >100.4     Notify your health care provider if you experience any of the following:  persistent nausea and vomiting or diarrhea     Notify your health care provider if you experience any of the following:  severe uncontrolled pain     Notify your health care provider if you experience any of the following:  redness, tenderness, or signs of infection (pain, swelling, redness, odor or green/yellow discharge around incision site)     Notify your health care provider if you experience any of the following:  difficulty breathing or increased cough     Notify your health care provider if you experience any of the following:  severe persistent headache     Notify your health care provider if you experience any of the following:  worsening rash     Notify your health care provider if you experience any of the following:  persistent dizziness, light-headedness, or visual disturbances     Notify your health care provider if you experience any of the following:  increased confusion or weakness     Activity as tolerated     Activity as tolerated     Medications:  Reconciled Home Medications:      Medication List      START taking these medications    * apixaban 5 mg (74 tabs) Dspk  Commonly known as:  ELIQUIS  For the first 7 days take two 5 mg tablets twice daily.  After 7 days take one 5 mg tablet twice daily. (1st script)     * apixaban 5 mg Tab  Commonly known as:  ELIQUIS  Take 1 tablet (5 mg total) by mouth 2 (two) times daily. Start AFTER Eliquis (apixaban) starter pack.  Start taking on:  May 28, 2020     aspirin 81 MG Chew  Chew and swallow 1 tablet (81 mg total) by mouth once daily.     atorvastatin 80 MG tablet  Commonly known as:  LIPITOR  Take 1 tablet (80 mg total) by mouth  every evening.     lisinopriL-hydrochlorothiazide 10-12.5 mg per tablet  Commonly known as:  PRINZIDE,ZESTORETIC  Take 1 tablet by mouth once daily.  Replaces:  lisinopriL-hydrochlorothiazide 20-25 mg Tab         * This list has 2 medication(s) that are the same as other medications prescribed for you. Read the directions carefully, and ask your doctor or other care provider to review them with you.            CHANGE how you take these medications    HYDROcodone-acetaminophen 5-325 mg per tablet  Commonly known as:  NORCO  Take 1-2 tablets by mouth every 8 (eight) hours as needed for Pain.  What changed:  Another medication with the same name was removed. Continue taking this medication, and follow the directions you see here.        CONTINUE taking these medications    omeprazole 40 MG capsule  Commonly known as:  PRILOSEC  Take 1 capsule (40 mg total) by mouth once daily.     traZODone 50 MG tablet  Commonly known as:  DESYREL  Take 1 tablet (50 mg total) by mouth every evening.        STOP taking these medications    lisinopriL-hydrochlorothiazide 20-25 mg Tab  Commonly known as:  PRINZIDE,ZESTORETIC  Replaced by:  lisinopriL-hydrochlorothiazide 10-12.5 mg per tablet     pravastatin 20 MG tablet  Commonly known as:  PRAVACHOL            Time spent on the discharge of patient: 35 minutes    Desiree Woodward DO  Cardiology  [facility]

## 2020-04-30 NOTE — NURSING
1630: Pt discharged home from SICU; transported via wheelchair to front of hospital, released to pt's ride (family friend). Tolerated transport well. VSS. A&Ox4. IVs removed prior to leaving unit. Discharge instructions reviewed with pt, all questions answered.

## 2020-04-30 NOTE — PLAN OF CARE
No SW needs identified at this time.     SW reached out 4/29 regarding transportation needs and was not notified of any.        04/30/20 1317   Post-Acute Status   Post-Acute Authorization Other   Other Status No Post-Acute Service Needs   Discharge Plan   Discharge Plan A Home     Rosalva Ocasio LMSW   - Case Management

## 2020-04-30 NOTE — ASSESSMENT & PLAN NOTE
MRA of neck overnight for stroke work-up incidentally found carotid stenosis of 60-70% on the right ICA and 30% on the left ICA.    PLAN:  - Discharge on statin and ASA 81  - f/u in cardiology clinic in 4-6 weeks with carotid U/S prior to appointment

## 2020-05-01 ENCOUNTER — PATIENT OUTREACH (OUTPATIENT)
Dept: ADMINISTRATIVE | Facility: CLINIC | Age: 60
End: 2020-05-01

## 2020-05-01 NOTE — TELEPHONE ENCOUNTER
C3 nurse attempted to contact patient. The following occurred:   C3 nurse attempted to contact Willy Delgado for a TCC post hospital discharge follow up call. The patient is unable to conduct the call @ this time. The patient requested a callback in an hour.    The patient does not have a scheduled HOSFU appointment within 7 days post hospital discharge date 4/30/2020. Message sent to Physician staff to assist with HOSFU appointment scheduling.

## 2020-05-01 NOTE — PATIENT INSTRUCTIONS
Discharge Instructions for Pulmonary Embolism  A deep vein thrombosis (DVT) is a blood clot in a large vein deep in a leg, arm, or elsewhere in the body. The clot can separate from the vein, travel to the lungs and cut off blood flow. This is a pulmonary embolism (PE). Pulmonary embolism is very serious and may cause death.   Healthcare providers use the term venous thromboembolism (VTE) to describe both DVT and PE. They use the term VTE because the two conditions are very closely related. And, because their prevention and treatment are closely related.   Home care  Taking care of yourself is very important. To help prevent more blood clots from forming, follow your healthcare provider's instructions. Do the following:  · Take your medicines exactly as instructed. Dont skip doses. If you miss a dose, call your healthcare provider and ask what you should do.    · Have all lab tests as recommended. This is very important when you take medicines to prevent blood clots.   · If your healthcare provider has instructed you to do so, wear elastic (compression stockings).  · Get up and get moving.  · While sitting for long periods of time, move your knees, ankles, feet, and toes.  Lifestyle changes  To help prevent problems with your heart and blood vessels, do the following:   · If you smoke, get help to quit. Talk with your healthcare provider about medicines and programs that can help.  · Stay at a healthy weight. Talk to your healthcare provider about losing weight, if you are overweight  · Try to exercise at least 30 minutes on most days. Before starting an exercise program, talk with your healthcare provider.   · When traveling by car, make frequent stops to get up and move around.  · On long airplane rides, get up and move around when possible. If you cant get up, wiggle your toes, move your ankles and tighten your calves to keep your blood moving.  Follow-up care  Make a follow-up appointment as directed  Have  your lab work done as directed.     When to seek medical advice  Call your healthcare provider if you have pain, swelling, and redness in your leg, arm, or other body area. These symptoms may mean another blood clot.  And, call your healthcare provider if you have signs and symptoms of bleeding, like blood in your urine, bleeding with bowel movements, or bleeding from the nose, gums, a cut, or vagina.   Call 911  Call 911 or get emergency help if you have symptoms of a blood clot in the lungs including:   · Chest pain  · Trouble breathing  · Coughing (may cough up blood)  · Fast heartbeat  · Sweating  · Fainting  Also call 911 if you have:  · Heavy or uncontrolled bleeding. If you are taking a blood thinner, you have an increased chance of bleeding.     © 1944-7639 [employer], "[employer] [address] [place] [address], [place] [address]. All rights reserved. This information is not intended as a substitute for professional medical care. Always follow your healthcare professional's instructions.

## 2020-05-01 NOTE — TELEPHONE ENCOUNTER
Called phone numbers available on chart without success; left voice message with contact information and instructions to call [facility] upon receipt of this message if our services are still needed.    2049 Pt states that he has mild to moderate pain (4/10 on 0-10 scale) to the left medial-lateral mid thoracic back intercostal space. Pt states that discomfort is reproduced only when trying to take a deep breath and that pain is close to the skin and in between his ribs. Pt describes pain as close to hip but in the middle of his ribcage; pt also states that pain is just below his left axillae. Pt denies SOB, chest pain, extremity weakness or incontinence. Negative for stridor or wheezing. Pt states that he doesn't feel the discomfort while lying. Care advice provided and pt instructed to call PCP in the AM or within 3 days; pt voices verbal understanding and agrees with disposition.   Instructed pt to call [facility] back for further assistance if needed or if symptoms worsened and call 911 for all emergenices; pt voiced verbal understanding and agrees with goals.      Reason for Disposition   [1] MODERATE back pain (e.g., interferes with normal activities) AND [2] present > 3 days    Additional Information   Negative: Passed out (i.e., lost consciousness, collapsed and was not responding)   Negative: Shock suspected (e.g., cold/pale/clammy skin, too weak to stand, low BP, rapid pulse)   Negative: Sounds like a life-threatening emergency to the triager   Negative: Major injury to the back (e.g., MVA, fall > 10 feet or 3 meters, penetrating injury, etc.)   Negative: Followed a tailbone injury   Negative: [1] Pain in the upper back over the ribs (rib cage) AND [2] radiates (travels, goes) into chest   Negative: [1] Pain in the upper back over the ribs (rib cage) AND [2] worsened by coughing (or clearly increases with breathing)   Negative: Back pain during pregnancy   Negative: Pain mainly in flank (i.e., in the side,  "over the lower ribs or just below the ribs)   Negative: [1] SEVERE back pain (e.g., excruciating) AND [2] sudden onset AND [3] age > 60   Negative: [1] Unable to urinate (or only a few drops) > 4 hours AND     [2] bladder feels very full (e.g., palpable bladder or strong urge to urinate)   Negative: [1] Urinary or bowel incontinence (i.e., loss of bladder or bowel control) AND [2] new onset   Negative: Numbness in groin or rectal area (i.e., loss of sensation)   Negative: [1] SEVERE abdominal pain AND [2] present > 1 hour   Negative: [1] Abdominal pain AND [2] age > 60   Negative: Weakness of a leg or foot (e.g., unable to bear weight, dragging foot)   Negative: Unable to walk   Negative: Patient sounds very sick or weak to the triager   Negative: [1] SEVERE back pain (e.g., excruciating, unable to do any normal activities) AND [2] not improved 2 hours after pain medicine   Negative: [1] Pain radiates into the thigh or further down the leg AND [2] both legs   Negative: [1] Fever > 100.5 F (38.1 C) AND [2] flank pain (i.e., in side, below ribs and above hip)   Negative: [1] Pain or burning with urination AND [2] flank pain (i.e., in side, below ribs and above hip)   Negative: Numbness in a leg or foot (i.e., loss of sensation)   Negative: High-risk adult (e.g., history of cancer, HIV, or IV drug abuse)   Negative: [1] Fever AND [2] no symptoms of UTI  (Exception: has generalized muscle pains, not localized back pain)   Negative: Rash in same area as pain (may be described as "small blisters")   Negative: Blood in urine (red, pink, or tea-colored)    Protocols used: ST BACK PAIN-A-AH      "

## 2020-05-01 NOTE — TELEPHONE ENCOUNTER
Appointment made.  Pt requested a later date due to going out of town with family.    Thanks,  Payal Powell RN

## 2020-05-11 ENCOUNTER — NURSE TRIAGE (OUTPATIENT)
Dept: ADMINISTRATIVE | Facility: CLINIC | Age: 60
End: 2020-05-11

## 2020-05-11 NOTE — TELEPHONE ENCOUNTER
No contact made with patient after two attempts made today, on day 14 of post procedure monitoring program.    Reason for Disposition   No answer.  First attempt to contact caller.  Follow-up call scheduled within 15 minutes.    Additional Information   Negative: Caller has already spoken with the PCP (or office), and has no further questions   Negative: Caller has already spoken with another triager and has no further questions   Negative: Caller has already spoken with another triager or PCP (or office), and has further questions and triager able to answer questions.   Negative: Busy signal.  First attempt to contact caller.  Follow-up call scheduled within 15 minutes.    Protocols used: NO CONTACT OR DUPLICATE CONTACT CALL-A-OH

## 2020-06-05 ENCOUNTER — TELEPHONE (OUTPATIENT)
Dept: ORTHOPEDICS | Facility: CLINIC | Age: 60
End: 2020-06-05

## 2020-06-05 NOTE — TELEPHONE ENCOUNTER
A detailed message was left for patient, regarding his appointment being cancelled with [facility] and rescheduled with new provider due to nile no longer being available at this location

## 2020-07-05 ENCOUNTER — PATIENT OUTREACH (OUTPATIENT)
Dept: ADMINISTRATIVE | Facility: OTHER | Age: 60
End: 2020-07-05

## 2020-07-05 DIAGNOSIS — Z12.11 SCREEN FOR COLON CANCER: Primary | ICD-10-CM

## 2020-07-07 ENCOUNTER — TELEPHONE (OUTPATIENT)
Dept: ORTHOPEDICS | Facility: CLINIC | Age: 60
End: 2020-07-07

## 2020-07-07 NOTE — TELEPHONE ENCOUNTER
Patient requested to have his appointment rescheduled, patient verbalized understanding of new appointment

## 2020-07-17 ENCOUNTER — PATIENT OUTREACH (OUTPATIENT)
Dept: ADMINISTRATIVE | Facility: OTHER | Age: 60
End: 2020-07-17

## 2020-08-24 PROBLEM — R29.898 WEAKNESS OF RIGHT UPPER EXTREMITY: Status: RESOLVED | Noted: 2020-02-20 | Resolved: 2020-08-24

## 2020-08-24 PROBLEM — M25.611 DECREASED ROM OF RIGHT SHOULDER: Status: RESOLVED | Noted: 2020-02-20 | Resolved: 2020-08-24

## 2020-10-05 ENCOUNTER — PATIENT MESSAGE (OUTPATIENT)
Dept: ADMINISTRATIVE | Facility: HOSPITAL | Age: 60
End: 2020-10-05

## 2021-01-04 ENCOUNTER — PATIENT MESSAGE (OUTPATIENT)
Dept: ADMINISTRATIVE | Facility: HOSPITAL | Age: 61
End: 2021-01-04

## 2021-04-05 ENCOUNTER — PATIENT MESSAGE (OUTPATIENT)
Dept: ADMINISTRATIVE | Facility: HOSPITAL | Age: 61
End: 2021-04-05

## 2021-04-28 ENCOUNTER — PATIENT MESSAGE (OUTPATIENT)
Dept: RESEARCH | Facility: HOSPITAL | Age: 61
End: 2021-04-28

## (undated) DEVICE — SEE MEDLINE ITEM 157187

## (undated) DEVICE — KIT PROBE COVER WITH GEL

## (undated) DEVICE — TRAY PICC CENTRAL LINE DRSNG

## (undated) DEVICE — GLIDESHEATH SLENDER SS 5FR10CM

## (undated) DEVICE — GUIDEWIRE .021X180CM J-3MM TIP

## (undated) DEVICE — OMNIPAQUE 350 200ML

## (undated) DEVICE — SPIKE CONTRAST CONTROLLER

## (undated) DEVICE — SEE MEDLINE ITEM 156894

## (undated) DEVICE — KIT CUSTOM MANIFOLD

## (undated) DEVICE — KIT MICROINTRO 4F .018X40X7CM

## (undated) DEVICE — Device

## (undated) DEVICE — CATH WEDGE 4FR 60CM